# Patient Record
Sex: FEMALE | Race: WHITE | NOT HISPANIC OR LATINO | URBAN - METROPOLITAN AREA
[De-identification: names, ages, dates, MRNs, and addresses within clinical notes are randomized per-mention and may not be internally consistent; named-entity substitution may affect disease eponyms.]

---

## 2021-04-08 DIAGNOSIS — Z23 ENCOUNTER FOR IMMUNIZATION: ICD-10-CM

## 2023-03-08 ENCOUNTER — OFFICE VISIT (OUTPATIENT)
Dept: DERMATOLOGY | Facility: CLINIC | Age: 48
End: 2023-03-08

## 2023-03-08 VITALS — HEIGHT: 69 IN | WEIGHT: 200 LBS | TEMPERATURE: 97.9 F | BODY MASS INDEX: 29.62 KG/M2

## 2023-03-08 DIAGNOSIS — L73.2 HIDRADENITIS SUPPURATIVA: Primary | ICD-10-CM

## 2023-03-08 RX ORDER — BUSPIRONE HYDROCHLORIDE 15 MG/1
15 TABLET ORAL 2 TIMES DAILY
COMMUNITY
Start: 2020-10-01

## 2023-03-08 RX ORDER — LEVOTHYROXINE, LIOTHYRONINE 9.5; 2.25 UG/1; UG/1
TABLET ORAL
COMMUNITY
Start: 2023-03-03

## 2023-03-08 RX ORDER — CITALOPRAM 20 MG/1
20 TABLET ORAL DAILY
COMMUNITY
Start: 2023-03-06

## 2023-03-08 RX ORDER — METFORMIN HYDROCHLORIDE 500 MG/1
TABLET, EXTENDED RELEASE ORAL
COMMUNITY
Start: 2021-10-01

## 2023-03-08 RX ORDER — B-COMPLEX WITH VITAMIN C
TABLET ORAL
COMMUNITY

## 2023-03-08 RX ORDER — CHLORAL HYDRATE 500 MG
1 CAPSULE ORAL 2 TIMES DAILY
COMMUNITY

## 2023-03-08 RX ORDER — CETIRIZINE HYDROCHLORIDE 10 MG/1
10 TABLET ORAL DAILY
COMMUNITY

## 2023-03-08 RX ORDER — PYRIDOXINE HCL (VITAMIN B6) 100 MG
100 TABLET ORAL DAILY
COMMUNITY

## 2023-03-08 RX ORDER — LOSARTAN POTASSIUM 100 MG/1
100 TABLET ORAL DAILY
COMMUNITY
Start: 2022-12-26

## 2023-03-08 RX ORDER — LEVOTHYROXINE AND LIOTHYRONINE 76; 18 UG/1; UG/1
120 TABLET ORAL DAILY
COMMUNITY
Start: 2020-03-17

## 2023-03-08 NOTE — PROGRESS NOTES
[facility] Note     Patient Name: Vera Pathak  Encounter Date: 03/08/2023    Have you been cared for by a [place] [id number] Dermatologist in the last 3 years and, if so, which description applies to you? NO  I am considered a "new" patient and must complete all patient intake questions  I am FEMALE/of child-bearing potential     REVIEW OF SYSTEMS:  Have you recently had or currently have any of the following? · Recent fever or chills? YES, 1 week ago   · Any non-healing wound? YES,   · Are you pregnant or planning to become pregnant? No  · Are you currently or planning to be nursing or breast feeding? No   PAST MEDICAL HISTORY:  Have you personally ever had or currently have any of the following? If "YES," then please provide more detail  · Skin cancer (such as Melanoma, Basal Cell Carcinoma, Squamous Cell Carcinoma? No  · Tuberculosis, HIV/AIDS, Hepatitis B or C: No  · Systemic Immunosuppression such as Diabetes, Biologic or Immunotherapy, Chemotherapy, Organ Transplantation, Bone Marrow Transplantation YES, diabetes   · Radiation Treatment No   FAMILY HISTORY:  Any "first degree relatives" (parent, brother, sister, or child) with the following? • Skin Cancer, Pancreatic or Other Cancer? No   PATIENT EXPERIENCE:    • Do you want the Dermatologist to perform a COMPLETE skin exam today including a clinical examination under the "bra and underwear" areas? NO  • If necessary, do we have your permission to call and leave a detailed message on your Preferred Phone number that includes your specific medical information?   Yes      Allergies   Allergen Reactions   • Erythromycin Abdominal Pain, Other (See Comments) and Vomiting     vomit     • Vancomycin Abdominal Pain, GI Intolerance, Other (See Comments) and Vomiting     vomiting        Current Outpatient Medications:   •  B Complex Vitamins (Vitamin B Complex) TABS, , Disp: , Rfl:   •  busPIRone (BUSPAR) 15 mg tablet, Take 15 mg by mouth 2 (two) times a day, Disp: , Rfl:   •  cetirizine (ZyrTEC) 10 mg tablet, Take 10 mg by mouth daily, Disp: , Rfl:   •  citalopram (CeleXA) 20 mg tablet, Take 20 mg by mouth daily, Disp: , Rfl:   •  Diclofenac Sodium (VOLTAREN) 1 %, , Disp: , Rfl:   •  losartan (COZAAR) 100 MG tablet, Take 100 mg by mouth daily, Disp: , Rfl:   •  metFORMIN (GLUCOPHAGE-XR) 500 mg 24 hr tablet, take 2 tablets by mouth twice a day with meals, Disp: , Rfl:   •  NP Thyroid 15 MG tablet, take 1 tablet by mouth IN ADDITION  MGS FOR A TOTAL  MGS FOR 7 DAYS, Disp: , Rfl:   •  Omega-3 1000 MG CAPS, Take 1 g by mouth 2 (two) times a day, Disp: , Rfl:   •  pyridoxine (B-6) 100 MG tablet, Take 100 mg by mouth daily, Disp: , Rfl:   •  thyroid (ARMOUR) 120 MG tablet, Take 120 mg by mouth daily, Disp: , Rfl:           • Whom besides the patient is providing clinical information about today's encounter?   o NO ADDITIONAL HISTORIAN (patient alone provided history)    Physical Exam and Assessment/Plan by Diagnosis:    HIDRADENITIS SUPPURATIVA    Physical Exam:  • Anatomic Location Affected:  Axilla, groin   • Morphological Description:  Draining nodules, sinus tracts, tunneling, hurly stage 3   • Pertinent Positives:  • Pertinent Negatives: Additional History of Present Condition:  Patient states she has had hidradenitis for 15 years, she usually has flare ups in the groin, axilla, back, under breast, and posterior neck areas  Patient has used doxycycline and clindamycin in the past      Assessment and Plan:  Based on a thorough discussion of this condition and the management approach to it (including a comprehensive discussion of the known risks, side effects and potential benefits of treatment), the patient (family) agrees to implement the following specific plan:  • Do not smoke as it can trigger flare ups  • Loosing weight can also help     Discussed the several ways for treatment including:  the use of topical medications, wash with benzoyl peroxide or Hibiclens   oral medications such as antibiotics when having an active flare  steroid injections- will do PA  Incision and drainage  injectable medications for disease control like Humira which was discussed with patient that this medication lowers your immune system  Procedures, such as surgery  • Please contact endocrinologist to see if they are ok with you using Humira  • Recommend talking to PCP about switching losartan to spirolactone  What is hidradenitis suppurativa? Hidradenitis suppurativa is an inflammatory skin disease that affects apocrine gland-bearing skin in the axillae, in the groin, and under the breasts  It is characterised by recurrent boil-like nodules and abscesses that culminate in pus-like discharge, difficult-to-heal open wounds (sinuses) and scarring  Hidradenitis suppurativa also has significant psychological impact and many patients suffer from impairment of body image, depression and anxiety  The term hidradenitis implies it starts as an inflammatory disorder of sweat glands, which is now known to be incorrect  Hidradenitis suppurativa is also known as acne inversa  Who gets hidradenitis suppurativa? Hidradenitis often starts at puberty, and is most active between the ages of 21 and 36 years, and in women, can resolve at menopause  It is three times more common in females than in males  Risk factors include:  • Other family members with hidradenitis suppurativa  • Obesity and insulin resistance/metabolic syndrome  • Cigarette smoking  • Follicular occlusion disorders: acne conglobata, dissecting cellulitis, pilonidal sinus  • Inflammatory bowel disease (Crohn disease)  • Rare autoinflammatory syndromes associated with abnormalities of PSTPIP1 gene  *    * PAPA syndrome (pyogenic arthritis, pyoderma gangrenosum and acne), PASH syndrome (pyoderma gangrenosum, acne, suppurative hidradenitis) and PAPASH syndrome (pyogenic arthritis, pyoderma gangrenosum, acne, suppurative hidradenitis)  What causes hidradenitis suppurativa? Hidradenitis suppurativa is an autoinflammatory disorder  Although the exact cause is not yet understood, contributing factors include:  • Friction from clothing and body folds  • Aberrant immune response to commensal bacteria  • Abnormal cutaneous or follicular microbiome  • Follicular occlusion  • Release of pro-inflammatory cytokines  • Inflammation causing rupture of the follicular wall and destroying apocrine glands and ducts  • Secondary bacterial infection  • Certain drugs  What are the clinical features of hidradenitis suppurativa? Hidradenitis can affect a single or multiple areas in the armpits, neck, sub mammary area, and inner thighs  Anogenital involvement most commonly affects the groin, mons pubis, vulva (in females), sides of the scrotum (in males), perineum, buttocks and perianal folds  Signs include:  • Open and closed comedones  • Painful firm papules, larger nodules and pleated ridges  • Pustules, fluctuant pseudocysts and abscesses  • Pyogenic granulomas  • Draining sinuses linking inflammatory lesions  • Hypertrophic and atrophic scars  Many patients with hidradenitis suppurativa also suffer from other skin disorders, including acne, hirsutism and psoriasis  The severity and extent of hidradenitis suppurativa is recorded at assessment and when determining the impact of a treatment  The Kate system describes three distinct clinical stages:  1  Solitary or multiple, isolated abscess formation without scarring or sinus tracts  2  Recurrent abscesses, single or multiple widely  lesions, with sinus tract formation  3  Diffuse or broad involvement, with multiple interconnected sinus tracts and abscesses      Severe hidradenitis (Gipson Stage 3) has been associated with:  • Male sex  • Axillary and perianal involvement  • Obesity  • Smoking  • Higher risk of stroke, coronary artery disease, heart failure, and peripheral artery disease  • Disease duration  What is the treatment for hidradenitis suppurativa? General measures  • Weight loss; follow an anti-inflammatory, low-sugar, low-grain, low-dairy diet (mainly plants)  • Smoking cessation: this can lead to improvement within a few months  • Loose fitting clothing  • Daily unfragranced antiperspirants  • If prone to secondary infection, wash with antiseptics or take bleach baths  • Apply hydrogen peroxide solution or medical grade honey to reduce malodour  • Use peeling agents such as resorcinol 15% cream to de-roof nodules  • Apply simple dressings to draining sinuses  • Analgesics, such as paracetamol (acetaminophen), for pain control  • Seek help to manage anxiety and depression  Medical management of hidradenitis suppurativa  Medical management of hidradenitis suppurativa is difficult  Treatment is required long term  Effective options are listed below  Antibiotics  • Topical clindamycin, with benzoyl peroxide to reduce bacterial resistance  • Short course of oral antibiotics for acute staphylococcal abscesses, eg flucloxacillin  • Prolonged courses (minimum 3 months) of tetracycline, metronidazole, trimethoprim + sulphamethoxazole, fluoroquinolones, ertapenem or dapsone for their anti-inflammatory action  • 6-12 week courses of the combination of clindamycin (or doxycycline) and rifampicin for severe disease  Antiandrogens  • Long-term oral contraceptive pill; antiandrogenic progesterones drospirenone or cyproterone acetate may be more effective than standard combined pills  These are more suitable than progesterone-only pills or devices  • Spironolactone and finasteride  • Response takes 6 months or longer  Immunomodulatory treatments for severe disease  • Intralesional corticosteroids into nodules  • Systemic corticosteroids short-term for flares  • Methotrexate, ciclosporin, and azathioprine  • TNF-?  inhibitors adalimumab and infliximab, used in higher dose than required for psoriasis, are the most successful treatments to date  Note that paradoxically, they may sometimes induce new-onset hidradenitis suppurativa  • Other biologics are under investigation, such as the IL-1?  antagonist, canakinumab    Other medical treatments  • Metformin in patients with insulin resistance  • Acitretin (unsuitable for females of childbearing potential)  • Isotretinoin -- effective for acne but appears unhelpful for most cases of hidradenitis  • Colchicine  • Medical management of anxiety and depression    Surgical management of hidradenitis suppurativa  • Incision and drainage of acute abscesses  • Curettage and deroofing of nodules, abscesses and sinuses  • Laser ablation of nodules, abscesses and sinuses  • Wide local excision of persistent nodules  • Radical excisional surgery of entire affected area  • Nd:YAG laser hair removal    Scribe Attestation    I,:  Milo Schumacher MA am acting as a scribe while in the presence of the attending physician :       I,:  Catarina Ford MD personally performed the services described in this documentation    as scribed in my presence :

## 2023-03-08 NOTE — PATIENT INSTRUCTIONS
Assessment and Plan:  Based on a thorough discussion of this condition and the management approach to it (including a comprehensive discussion of the known risks, side effects and potential benefits of treatment), the patient (family) agrees to implement the following specific plan:  Do not smoke as it can trigger flare ups  Loosing weight can also help  Discussed the several ways for treatment including:  the use of topical medications, wash with benzoyl peroxide or Hibiclens   oral medications such as antibiotics when having an active flare  steroid injections  Incision and drainage  injectable medications for disease control like Humira which was discussed with patient that this medication lowers your immune system  Procedures, such as surgery  Please contact endocrinologist to see if they are ok with you using Humira  Recommend talking to PCP about switching losartan to spirolactone  What is hidradenitis suppurativa? Hidradenitis suppurativa is an inflammatory skin disease that affects apocrine gland-bearing skin in the axillae, in the groin, and under the breasts  It is characterised by recurrent boil-like nodules and abscesses that culminate in pus-like discharge, difficult-to-heal open wounds (sinuses) and scarring  Hidradenitis suppurativa also has significant psychological impact and many patients suffer from impairment of body image, depression and anxiety  The term hidradenitis implies it starts as an inflammatory disorder of sweat glands, which is now known to be incorrect  Hidradenitis suppurativa is also known as acne inversa  Who gets hidradenitis suppurativa? Hidradenitis often starts at puberty, and is most active between the ages of 21 and 36 years, and in women, can resolve at menopause  It is three times more common in females than in males  Risk factors include:   Other family members with hidradenitis suppurativa  Obesity and insulin resistance/metabolic syndrome  Cigarette smoking  Follicular occlusion disorders: acne conglobata, dissecting cellulitis, pilonidal sinus  Inflammatory bowel disease (Crohn disease)  Rare autoinflammatory syndromes associated with abnormalities of PSTPIP1 gene  *    * PAPA syndrome (pyogenic arthritis, pyoderma gangrenosum and acne), PASH syndrome (pyoderma gangrenosum, acne, suppurative hidradenitis) and PAPASH syndrome (pyogenic arthritis, pyoderma gangrenosum, acne, suppurative hidradenitis)  What causes hidradenitis suppurativa? Hidradenitis suppurativa is an autoinflammatory disorder  Although the exact cause is not yet understood, contributing factors include:  Friction from clothing and body folds  Aberrant immune response to commensal bacteria  Abnormal cutaneous or follicular microbiome  Follicular occlusion  Release of pro-inflammatory cytokines  Inflammation causing rupture of the follicular wall and destroying apocrine glands and ducts  Secondary bacterial infection  Certain drugs  What are the clinical features of hidradenitis suppurativa? Hidradenitis can affect a single or multiple areas in the armpits, neck, sub mammary area, and inner thighs  Anogenital involvement most commonly affects the groin, mons pubis, vulva (in females), sides of the scrotum (in males), perineum, buttocks and perianal folds  Signs include:  Open and closed comedones  Painful firm papules, larger nodules and pleated ridges  Pustules, fluctuant pseudocysts and abscesses  Pyogenic granulomas  Draining sinuses linking inflammatory lesions  Hypertrophic and atrophic scars  Many patients with hidradenitis suppurativa also suffer from other skin disorders, including acne, hirsutism and psoriasis  The severity and extent of hidradenitis suppurativa is recorded at assessment and when determining the impact of a treatment   The Kate system describes three distinct clinical stages:  Solitary or multiple, isolated abscess formation without scarring or sinus tracts  Recurrent abscesses, single or multiple widely  lesions, with sinus tract formation  Diffuse or broad involvement, with multiple interconnected sinus tracts and abscesses  Severe hidradenitis (Gipson Stage 3) has been associated with:  Male sex  Axillary and perianal involvement  Obesity  Smoking  Higher risk of stroke, coronary artery disease, heart failure, and peripheral artery disease  Disease duration  What is the treatment for hidradenitis suppurativa? General measures  Weight loss; follow an anti-inflammatory, low-sugar, low-grain, low-dairy diet (mainly plants)  Smoking cessation: this can lead to improvement within a few months  Loose fitting clothing  Daily unfragranced antiperspirants  If prone to secondary infection, wash with antiseptics or take bleach baths  Apply hydrogen peroxide solution or medical grade honey to reduce malodour  Use peeling agents such as resorcinol 15% cream to de-roof nodules  Apply simple dressings to draining sinuses  Analgesics, such as paracetamol (acetaminophen), for pain control  Seek help to manage anxiety and depression  Medical management of hidradenitis suppurativa  Medical management of hidradenitis suppurativa is difficult  Treatment is required long term  Effective options are listed below  Antibiotics  Topical clindamycin, with benzoyl peroxide to reduce bacterial resistance  Short course of oral antibiotics for acute staphylococcal abscesses, eg flucloxacillin  Prolonged courses (minimum 3 months) of tetracycline, metronidazole, trimethoprim + sulphamethoxazole, fluoroquinolones, ertapenem or dapsone for their anti-inflammatory action  6-12 week courses of the combination of clindamycin (or doxycycline) and rifampicin for severe disease  Antiandrogens  Long-term oral contraceptive pill; antiandrogenic progesterones drospirenone or cyproterone acetate may be more effective than standard combined pills   These are more suitable than progesterone-only pills or devices  Spironolactone and finasteride  Response takes 6 months or longer  Immunomodulatory treatments for severe disease  Intralesional corticosteroids into nodules  Systemic corticosteroids short-term for flares  Methotrexate, ciclosporin, and azathioprine  TNF-? inhibitors adalimumab and infliximab, used in higher dose than required for psoriasis, are the most successful treatments to date  Note that paradoxically, they may sometimes induce new-onset hidradenitis suppurativa  Other biologics are under investigation, such as the IL-1?  antagonist, canakinumab    Other medical treatments  Metformin in patients with insulin resistance  Acitretin (unsuitable for females of childbearing potential)  Isotretinoin -- effective for acne but appears unhelpful for most cases of hidradenitis  Colchicine  Medical management of anxiety and depression    Surgical management of hidradenitis suppurativa  Incision and drainage of acute abscesses  Curettage and deroofing of nodules, abscesses and sinuses  Laser ablation of nodules, abscesses and sinuses  Wide local excision of persistent nodules  Radical excisional surgery of entire affected area  Nd:YAG laser hair removal

## 2023-03-17 ENCOUNTER — APPOINTMENT (OUTPATIENT)
Dept: LAB | Facility: CLINIC | Age: 48
End: 2023-03-17

## 2023-03-17 ENCOUNTER — PATIENT MESSAGE (OUTPATIENT)
Dept: DERMATOLOGY | Facility: CLINIC | Age: 48
End: 2023-03-17

## 2023-03-17 DIAGNOSIS — L73.2 HIDRADENITIS SUPPURATIVA: ICD-10-CM

## 2023-03-18 LAB
HBV CORE AB SER QL: NORMAL
HBV SURFACE AB SER-ACNC: <3 MIU/ML
HBV SURFACE AG SER QL: NORMAL
HCV AB SER QL: NORMAL

## 2023-03-19 LAB
GAMMA INTERFERON BACKGROUND BLD IA-ACNC: 0.04 IU/ML
M TB IFN-G BLD-IMP: NEGATIVE
M TB IFN-G CD4+ BCKGRND COR BLD-ACNC: 0 IU/ML
M TB IFN-G CD4+ BCKGRND COR BLD-ACNC: 0 IU/ML
MITOGEN IGNF BCKGRD COR BLD-ACNC: >10 IU/ML

## 2023-03-20 ENCOUNTER — TELEPHONE (OUTPATIENT)
Dept: DERMATOLOGY | Facility: CLINIC | Age: 48
End: 2023-03-20

## 2023-03-21 NOTE — TELEPHONE ENCOUNTER
Please do PA for humira for hidradenitis  Hidradenitis suppurativa  Starter kit  Inject 160 mg subcutaneously on day 1  Inject 80 mg on day 15  Inject 80 mg every other week starting on day 29

## 2023-03-31 ENCOUNTER — TELEPHONE (OUTPATIENT)
Dept: DERMATOLOGY | Age: 48
End: 2023-03-31

## 2023-03-31 NOTE — TELEPHONE ENCOUNTER
Patient called to find out status of the kenalog injection and Humira prior authorization       Informed her the PA for humira was sent to the specialist on 3/20/23 and can take up to 30 days for an approval or denial  As for the kenalog injection informed her I will message the D[facility] in the room to check status and reach out to you to get scheduled   -patient understood

## 2023-05-17 ENCOUNTER — OFFICE VISIT (OUTPATIENT)
Dept: DERMATOLOGY | Facility: CLINIC | Age: 48
End: 2023-05-17

## 2023-05-17 DIAGNOSIS — L73.2 HIDRADENITIS SUPPURATIVA: Primary | ICD-10-CM

## 2023-05-17 NOTE — PROGRESS NOTES
HUMIRA Biologic Injectable Administration     Additional History of Present Condition:  Patient is present for education and administration of Humira  Medication administration order placed yes  Provider order matches prescription order yes  Assessment and Plan:  Based on a thorough discussion of this condition and the management approach to it (including a comprehensive discussion of the known risks, side effects and potential benefits of treatment), the patient (family) agrees to implement the following specific plan:  • First yes Humira injection administered today in the left upper am  • Verbal consent obtained to administer  • Next dose due 6/1/2023, then 6/15/2023  • Yes, Patient provided education and training to continue to administer this at home  • Side effects discussed and how to report  • Schedule 6 month follow up with ordering provider  Appointment scheduled       Biologic Injectable Administration Note  Diagnosis: Hidradenitis suppurativa   This is injection number 1    Informed consent: Discussed risks (Risks of hypersensitivity reaction, injection site reaction, conjunctivitis/keratitis, HSV reactivation, increased susceptibility to parasitic infections, inefficacy were reviewed ) Verbal consent obtained  Preparation: After discussion potential procedure related risks including pain, bleeding, new infection, reactivation of latent infection, inefficacy, increased risk of malignancy, hypersensitivity reaction, injection site reaction, verbal consent was obtained  The areas were cleansed with alcohol prep pads and allowed to fully air dry for 3 minutes  Procedure Details:  160 mg was injected subcutaneously in the left upper arm  Lot Number: [id number]  Expiration: 05/2024  Total Injected: 160 mg  NDC: 38957919510     Patient tolerated procedure well, with minimal pinpoint bleeding that was controlled with pressure  Aftercare was reviewed           IMPORTANT SAFETY INFORMATION   ABOUT HUMIRA® (adalimumab)1  What is the most important information I should know about HUMIRA? You should discuss the potential benefits and risks of HUMIRA with your doctor  HUMIRA is a TNF blocker medicine that can lower the ability of your immune system to fight infections  You should not start taking HUMIRA if you have any kind of infection unless your doctor says it is okay  · Serious infections have happened in people taking HUMIRA  These serious infections include tuberculosis (TB) and infections caused by viruses, fungi, or bacteria that have spread throughout the body  Some people have  from these infections  Your doctor should test you for TB before starting HUMIRA, and check you closely for signs and symptoms of TB during treatment with HUMIRA, even if your TB test was negative  If your doctor feels you are at risk, you may be treated with medicine for TB  · Cancer  For children and adults taking TNF blockers, including HUMIRA, the chance of getting lymphoma or other cancers may increase  There have been cases of unusual cancers in children, teenagers, and young adults using TNF blockers  Some people have developed a rare type of cancer called hepatosplenic T-cell lymphoma  This type of cancer often results in death  If using TNF blockers including HUMIRA, your chance of getting two types of skin cancer (basal cell and squamous cell) may increase  These types are generally not life-threatening if treated; tell your doctor if you have a bump or open sore that doesn’t heal   What should I tell my doctor BEFORE starting [address]?   Tell your doctor about all of your health conditions, including if you:  · Have an infection, are being treated for infection, or have symptoms of an infection  · Get a lot of infections or infections that keep coming back  · Have diabetes  · Have TB or have been in close contact with someone with TB, or were born in, lived in, or traveled where there is more risk for getting TB  · Live or have lived in an area (such as the [place] where there is an increased risk for getting certain kinds of fungal infections, such as histoplasmosis, coccidioidomycosis, or blastomycosis  These infections may happen or become more severe if you use HUMIRA  Ask your doctor if you are unsure if you have lived in these areas  · Have or have had hepatitis B  · Are scheduled for major surgery  · Have or have had cancer  · Have numbness or tingling or a nervous system disease such as multiple sclerosis or Guillain-Barré syndrome  · Have or had heart failure  · Have recently received or are scheduled to receive a vaccine  HUMIRA patients may receive vaccines, except for live vaccines  Children should be brought up to date on all vaccines before starting HUMIRA  · Are allergic to rubber, latex, or any HUMIRA ingredients  · Are pregnant, planning to become pregnant, breastfeeding, or planning to breastfeed  · Have a baby and you were using HUMIRA during your pregnancy  Tell your baby’s doctor before your baby receives any vaccines  Also tell your doctor about all the medicines you take  You should not take HUMIRA with ORENCIA® (abatacept), KINERET® (anakinra), REMICADE® (infliximab), ENBREL® (etanercept), CIMZIA® (certolizumab pegol), or SIMPONI® (golimumab)  Tell your doctor if you have ever used RITUXAN® (rituximab), IMURAN® (azathioprine), or PURINETHOL® (mercaptopurine, 6-MP)  What should I watch for AFTER starting [address] [facility]? HUMIRA can cause serious side effects, including:  · Serious infections  These include TB and infections caused by viruses, fungi, or bacteria  Symptoms related to TB include a cough, low-grade fever, weight loss, or loss of body fat and muscle  · Hepatitis B infection in carriers of the virus   Symptoms include muscle aches, feeling very tired, dark urine, skin or eyes that look yellow, little or no appetite, vomiting, reji-colored bowel movements, fever, chills, stomach discomfort, and skin rash  · Allergic reactions  Symptoms of a serious allergic reaction include hives, trouble breathing, and swelling of your face, eyes, lips, or mouth  · Nervous system problems  Signs and symptoms include numbness or tingling, problems with your vision, weakness in your arms or legs, and dizziness  · Blood problems (decreased blood cells that help fight infections or stop bleeding)  Symptoms include a fever that does not go away, bruising or bleeding very easily, or looking very pale  · Heart failure (new or worsening)  Symptoms include shortness of breath, swelling of your ankles or feet, and sudden weight gain  · Immune reactions including a lupus-like syndrome  Symptoms include chest discomfort or pain that does not go away, shortness of breath, joint pain, or rash on your cheeks or arms that gets worse in the sun  · Liver problems  Symptoms include feeling very tired, skin or eyes that look yellow, poor appetite or vomiting, and pain on the right side of your stomach (abdomen)  These problems can lead to liver failure and death  · Psoriasis (new or worsening)  Symptoms include red scaly patches or raised bumps that are filled with pus  Call your doctor or get medical care right away if you develop any of the above symptoms  Common side effects of HUMIRA include injection site reactions (pain, redness, rash, swelling, itching, or bruising), upper respiratory infections (sinus infections), headaches, rash, and nausea  These are not all of the possible side effects with HUMIRA  Tell your doctor if you have any side effect that bothers you or that does not go away    Remember, tell your doctor right away if you have an infection or symptoms of an infection, including:  · Fever, sweats, or chills  · Muscle aches  · Cough  · Shortness of breath  · Blood in phlegm  · Weight loss  · Warm, red, or painful skin or sores on your body  · Diarrhea or stomach pain  · Burning when you urinate  · Urinating more often than normal  · Feeling very tired  HUMIRA is given by injection under the skin  This is the most important information to know about HUMIRA  For more information, talk to your health care provider  Uses  HUMIRA is a prescription medicine used:  1  To reduce the signs and symptoms of:  ? Moderate to severe rheumatoid arthritis (RA) in adults  HUMIRA can be used alone, with methotrexate, or with certain other medicines  HUMIRA may prevent further damage to your bones and joints and may help your ability to perform daily activities  ? Moderate to severe polyarticular juvenile idiopathic arthritis (JOVANNY) in children 3years of age and older  HUMIRA can be used alone or with methotrexate  ? Psoriatic arthritis (PsA) in adults  HUMIRA can be used alone or with certain other medicines  HUMIRA may prevent further damage to your bones and joints and may help your ability to perform daily activities  ? Ankylosing spondylitis (AS) in adults  ? Moderate to severe hidradenitis suppurativa (HS) in people 12 years and older  2  To treat moderate to severe Crohn’s disease (CD) in adults and children 10years of age and older  3  To treat moderate to severe ulcerative colitis (UC) in adults and children 11years of age and older  It is not known if HUMIRA is effective in people who stopped responding to or could not tolerate anti-TNF medicines  4  To treat moderate to severe chronic plaque psoriasis (Ps) in adults who are ready for systemic therapy or phototherapy, and are under the care of a doctor who will decide if other systemic therapies are less appropriate  5  To treat non-infectious intermediate (middle part of the eye), posterior (back of the eye), and panuveitis (all parts of the eye) in adults and children 3years of age and older    [id number]      Scribe Attestation    I,:  Francis Earl am acting as a scribe while in the presence of the attending physician :       I,:  Cedric Prasad Riki Degroot MD personally performed the services described in this documentation    as scribed in my presence :

## 2023-05-17 NOTE — PATIENT INSTRUCTIONS
HUMIRA Biologic Injectable Administration     Additional History of Present Condition:  Patient is present for education and administration of Humira  Medication administration order placed yes  Provider order matches prescription order yes  Assessment and Plan:  Based on a thorough discussion of this condition and the management approach to it (including a comprehensive discussion of the known risks, side effects and potential benefits of treatment), the patient (family) agrees to implement the following specific plan:  First yes Humira injection administered today in the left upper am  Verbal consent obtained to administer  Next dose due 6/1/2023, then 6/15/2023  Yes, Patient provided education and training to continue to administer this at home  Side effects discussed and how to report  Schedule 6 month follow up with ordering provider  Appointment scheduled       Biologic Injectable Administration Note  Diagnosis: Hidradenitis suppurativa   This is injection number 1    Informed consent: Discussed risks (Risks of hypersensitivity reaction, injection site reaction, conjunctivitis/keratitis, HSV reactivation, increased susceptibility to parasitic infections, inefficacy were reviewed ) Verbal consent obtained  Preparation: After discussion potential procedure related risks including pain, bleeding, new infection, reactivation of latent infection, inefficacy, increased risk of malignancy, hypersensitivity reaction, injection site reaction, verbal consent was obtained  The areas were cleansed with alcohol prep pads and allowed to fully air dry for 3 minutes  Procedure Details:  160 mg was injected subcutaneously in the left upper arm  Lot Number: [id number]  Expiration: 05/2024  Total Injected: 160 mg  NDC: 30492287487     Patient tolerated procedure well, with minimal pinpoint bleeding that was controlled with pressure  Aftercare was reviewed           IMPORTANT SAFETY INFORMATION   ABOUT HUMIRA® (adalimumab)1  What is the most important information I should know about HUMIRA? You should discuss the potential benefits and risks of HUMIRA with your doctor  HUMIRA is a TNF blocker medicine that can lower the ability of your immune system to fight infections  You should not start taking HUMIRA if you have any kind of infection unless your doctor says it is okay  Serious infections have happened in people taking HUMIRA  These serious infections include tuberculosis (TB) and infections caused by viruses, fungi, or bacteria that have spread throughout the body  Some people have  from these infections  Your doctor should test you for TB before starting HUMIRA, and check you closely for signs and symptoms of TB during treatment with HUMIRA, even if your TB test was negative  If your doctor feels you are at risk, you may be treated with medicine for TB  Cancer  For children and adults taking TNF blockers, including HUMIRA, the chance of getting lymphoma or other cancers may increase  There have been cases of unusual cancers in children, teenagers, and young adults using TNF blockers  Some people have developed a rare type of cancer called hepatosplenic T-cell lymphoma  This type of cancer often results in death  If using TNF blockers including HUMIRA, your chance of getting two types of skin cancer (basal cell and squamous cell) may increase  These types are generally not life-threatening if treated; tell your doctor if you have a bump or open sore that doesn’t heal   What should I tell my doctor BEFORE starting [address]?   Tell your doctor about all of your health conditions, including if you:  Have an infection, are being treated for infection, or have symptoms of an infection  Get a lot of infections or infections that keep coming back  Have diabetes  Have TB or have been in close contact with someone with TB, or were born in, lived in, or traveled where there is more risk for getting TB  Live or have lived in an area (such as the [place] where there is an increased risk for getting certain kinds of fungal infections, such as histoplasmosis, coccidioidomycosis, or blastomycosis  These infections may happen or become more severe if you use HUMIRA  Ask your doctor if you are unsure if you have lived in these areas  Have or have had hepatitis B  Are scheduled for major surgery  Have or have had cancer  Have numbness or tingling or a nervous system disease such as multiple sclerosis or Guillain-Barré syndrome  Have or had heart failure  Have recently received or are scheduled to receive a vaccine  HUMIRA patients may receive vaccines, except for live vaccines  Children should be brought up to date on all vaccines before starting HUMIRA  Are allergic to rubber, latex, or any HUMIRA ingredients  Are pregnant, planning to become pregnant, breastfeeding, or planning to breastfeed  Have a baby and you were using HUMIRA during your pregnancy  Tell your baby’s doctor before your baby receives any vaccines  Also tell your doctor about all the medicines you take  You should not take HUMIRA with ORENCIA® (abatacept), KINERET® (anakinra), REMICADE® (infliximab), ENBREL® (etanercept), CIMZIA® (certolizumab pegol), or SIMPONI® (golimumab)  Tell your doctor if you have ever used RITUXAN® (rituximab), IMURAN® (azathioprine), or PURINETHOL® (mercaptopurine, 6-MP)  What should I watch for AFTER starting [address]? HUMIRA can cause serious side effects, including:  Serious infections  These include TB and infections caused by viruses, fungi, or bacteria  Symptoms related to TB include a cough, low-grade fever, weight loss, or loss of body fat and muscle  Hepatitis B infection in carriers of the virus  Symptoms include muscle aches, feeling very tired, dark urine, skin or eyes that look yellow, little or no appetite, vomiting, reji-colored bowel movements, fever, chills, stomach discomfort, and skin rash    Allergic reactions  Symptoms of a serious allergic reaction include hives, trouble breathing, and swelling of your face, eyes, lips, or mouth  Nervous system problems  Signs and symptoms include numbness or tingling, problems with your vision, weakness in your arms or legs, and dizziness  Blood problems (decreased blood cells that help fight infections or stop bleeding)  Symptoms include a fever that does not go away, bruising or bleeding very easily, or looking very pale  Heart failure (new or worsening)  Symptoms include shortness of breath, swelling of your ankles or feet, and sudden weight gain  Immune reactions including a lupus-like syndrome  Symptoms include chest discomfort or pain that does not go away, shortness of breath, joint pain, or rash on your cheeks or arms that gets worse in the sun  Liver problems  Symptoms include feeling very tired, skin or eyes that look yellow, poor appetite or vomiting, and pain on the right side of your stomach (abdomen)  These problems can lead to liver failure and death  Psoriasis (new or worsening)  Symptoms include red scaly patches or raised bumps that are filled with pus  Call your doctor or get medical care right away if you develop any of the above symptoms  Common side effects of HUMIRA include injection site reactions (pain, redness, rash, swelling, itching, or bruising), upper respiratory infections (sinus infections), headaches, rash, and nausea  These are not all of the possible side effects with HUMIRA  Tell your doctor if you have any side effect that bothers you or that does not go away    Remember, tell your doctor right away if you have an infection or symptoms of an infection, including:  Fever, sweats, or chills  Muscle aches  Cough  Shortness of breath  Blood in phlegm  Weight loss  Warm, red, or painful skin or sores on your body  Diarrhea or stomach pain  Burning when you urinate  Urinating more often than normal  Feeling very tired  HUMIRA is given by injection under the skin  This is the most important information to know about HUMIRA  For more information, talk to your health care provider  Uses  HUMIRA is a prescription medicine used: To reduce the signs and symptoms of: Moderate to severe rheumatoid arthritis (RA) in adults  HUMIRA can be used alone, with methotrexate, or with certain other medicines  HUMIRA may prevent further damage to your bones and joints and may help your ability to perform daily activities  Moderate to severe polyarticular juvenile idiopathic arthritis (JOVANNY) in children 3years of age and older  HUMIRA can be used alone or with methotrexate  Psoriatic arthritis (PsA) in adults  HUMIRA can be used alone or with certain other medicines  HUMIRA may prevent further damage to your bones and joints and may help your ability to perform daily activities  Ankylosing spondylitis (AS) in adults  Moderate to severe hidradenitis suppurativa (HS) in people 12 years and older  To treat moderate to severe Crohn’s disease (CD) in adults and children 10years of age and older  To treat moderate to severe ulcerative colitis (UC) in adults and children 11years of age and older  It is not known if HUMIRA is effective in people who stopped responding to or could not tolerate anti-TNF medicines  To treat moderate to severe chronic plaque psoriasis (Ps) in adults who are ready for systemic therapy or phototherapy, and are under the care of a doctor who will decide if other systemic therapies are less appropriate  To treat non-infectious intermediate (middle part of the eye), posterior (back of the eye), and panuveitis (all parts of the eye) in adults and children 3years of age and older    [id number]

## 2023-06-14 ENCOUNTER — TELEPHONE (OUTPATIENT)
Dept: DERMATOLOGY | Facility: CLINIC | Age: 48
End: 2023-06-14

## 2023-06-14 NOTE — TELEPHONE ENCOUNTER
Pt calling to state that she is having an HS flare-up in R armpit and hardly able to lift arm, and was asking if you would be willing to overbook for an injection? There are no available apts and pt not scheduled until October  Please advise if you would be willing to offer pt apt for injection w/in next few days  Thank you!

## 2023-06-15 ENCOUNTER — OFFICE VISIT (OUTPATIENT)
Dept: DERMATOLOGY | Facility: CLINIC | Age: 48
End: 2023-06-15
Payer: COMMERCIAL

## 2023-06-15 VITALS — HEIGHT: 69 IN | WEIGHT: 200 LBS | BODY MASS INDEX: 29.62 KG/M2 | TEMPERATURE: 98.2 F

## 2023-06-15 DIAGNOSIS — L73.2 HIDRADENITIS SUPPURATIVA: Primary | ICD-10-CM

## 2023-06-15 PROCEDURE — 99214 OFFICE O/P EST MOD 30 MIN: CPT | Performed by: DERMATOLOGY

## 2023-06-15 PROCEDURE — 11900 INJECT SKIN LESIONS </W 7: CPT | Performed by: DERMATOLOGY

## 2023-06-15 RX ORDER — CHOLESTYRAMINE 4 G/5.5G
POWDER, FOR SUSPENSION ORAL
COMMUNITY
Start: 2023-05-30

## 2023-06-15 RX ORDER — TRIAMCINOLONE ACETONIDE 40 MG/ML
40 INJECTION, SUSPENSION INTRA-ARTICULAR; INTRAMUSCULAR ONCE
Status: COMPLETED | OUTPATIENT
Start: 2023-06-15 | End: 2023-06-15

## 2023-06-15 RX ORDER — FAMOTIDINE 40 MG/1
40 TABLET, FILM COATED ORAL DAILY
COMMUNITY
Start: 2023-05-18

## 2023-06-15 RX ADMIN — TRIAMCINOLONE ACETONIDE 40 MG: 40 INJECTION, SUSPENSION INTRA-ARTICULAR; INTRAMUSCULAR at 15:52

## 2023-06-15 NOTE — PATIENT INSTRUCTIONS
Assessment and Plan:  Based on a thorough discussion of this condition and the management approach to it (including a comprehensive discussion of the known risks, side effects and potential benefits of treatment), the patient (family) agrees to implement the following specific plan:  Kenalog injection done in office today   Continue doxycyline   Continue Hunira injections  De-ryann procedure discussed     What is hidradenitis suppurativa? Hidradenitis suppurativa is an inflammatory skin disease that affects apocrine gland-bearing skin in the axillae, in the groin, and under the breasts  It is characterised by recurrent boil-like nodules and abscesses that culminate in pus-like discharge, difficult-to-heal open wounds (sinuses) and scarring  Hidradenitis suppurativa also has significant psychological impact and many patients suffer from impairment of body image, depression and anxiety  The term hidradenitis implies it starts as an inflammatory disorder of sweat glands, which is now known to be incorrect  Hidradenitis suppurativa is also known as acne inversa  Who gets hidradenitis suppurativa? Hidradenitis often starts at puberty, and is most active between the ages of 21 and 36 years, and in women, can resolve at menopause  It is three times more common in females than in males  Risk factors include: Other family members with hidradenitis suppurativa  Obesity and insulin resistance/metabolic syndrome  Cigarette smoking  Follicular occlusion disorders: acne conglobata, dissecting cellulitis, pilonidal sinus  Inflammatory bowel disease (Crohn disease)  Rare autoinflammatory syndromes associated with abnormalities of PSTPIP1 gene  *    * PAPA syndrome (pyogenic arthritis, pyoderma gangrenosum and acne), PASH syndrome (pyoderma gangrenosum, acne, suppurative hidradenitis) and PAPASH syndrome (pyogenic arthritis, pyoderma gangrenosum, acne, suppurative hidradenitis)      What causes hidradenitis suppurativa? Hidradenitis suppurativa is an autoinflammatory disorder  Although the exact cause is not yet understood, contributing factors include:  Friction from clothing and body folds  Aberrant immune response to commensal bacteria  Abnormal cutaneous or follicular microbiome  Follicular occlusion  Release of pro-inflammatory cytokines  Inflammation causing rupture of the follicular wall and destroying apocrine glands and ducts  Secondary bacterial infection  Certain drugs  What are the clinical features of hidradenitis suppurativa? Hidradenitis can affect a single or multiple areas in the armpits, neck, sub mammary area, and inner thighs  Anogenital involvement most commonly affects the groin, mons pubis, vulva (in females), sides of the scrotum (in males), perineum, buttocks and perianal folds  Signs include:  Open and closed comedones  Painful firm papules, larger nodules and pleated ridges  Pustules, fluctuant pseudocysts and abscesses  Pyogenic granulomas  Draining sinuses linking inflammatory lesions  Hypertrophic and atrophic scars  Many patients with hidradenitis suppurativa also suffer from other skin disorders, including acne, hirsutism and psoriasis  The severity and extent of hidradenitis suppurativa is recorded at assessment and when determining the impact of a treatment  The Kate system describes three distinct clinical stages:  Solitary or multiple, isolated abscess formation without scarring or sinus tracts  Recurrent abscesses, single or multiple widely  lesions, with sinus tract formation  Diffuse or broad involvement, with multiple interconnected sinus tracts and abscesses  Severe hidradenitis (Gipson Stage 3) has been associated with:  Male sex  Axillary and perianal involvement  Obesity  Smoking  Higher risk of stroke, coronary artery disease, heart failure, and peripheral artery disease  Disease duration      What is the treatment for hidradenitis suppurativa? General measures  Weight loss; follow an anti-inflammatory, low-sugar, low-grain, low-dairy diet (mainly plants)  Smoking cessation: this can lead to improvement within a few months  Loose fitting clothing  Daily unfragranced antiperspirants  If prone to secondary infection, wash with antiseptics or take bleach baths  Apply hydrogen peroxide solution or medical grade honey to reduce malodour  Use peeling agents such as resorcinol 15% cream to de-roof nodules  Apply simple dressings to draining sinuses  Analgesics, such as paracetamol (acetaminophen), for pain control  Seek help to manage anxiety and depression  Medical management of hidradenitis suppurativa  Medical management of hidradenitis suppurativa is difficult  Treatment is required long term  Effective options are listed below  Antibiotics  Topical clindamycin, with benzoyl peroxide to reduce bacterial resistance  Short course of oral antibiotics for acute staphylococcal abscesses, eg flucloxacillin  Prolonged courses (minimum 3 months) of tetracycline, metronidazole, trimethoprim + sulphamethoxazole, fluoroquinolones, ertapenem or dapsone for their anti-inflammatory action  6-12 week courses of the combination of clindamycin (or doxycycline) and rifampicin for severe disease  Antiandrogens  Long-term oral contraceptive pill; antiandrogenic progesterones drospirenone or cyproterone acetate may be more effective than standard combined pills  These are more suitable than progesterone-only pills or devices  Spironolactone and finasteride  Response takes 6 months or longer  Immunomodulatory treatments for severe disease  Intralesional corticosteroids into nodules  Systemic corticosteroids short-term for flares  Methotrexate, ciclosporin, and azathioprine  TNF-? inhibitors adalimumab and infliximab, used in higher dose than required for psoriasis, are the most successful treatments to date   Note that paradoxically, they may sometimes induce new-onset hidradenitis suppurativa  Other biologics are under investigation, such as the IL-1? antagonist, canakinumab    Other medical treatments  Metformin in patients with insulin resistance  Acitretin (unsuitable for females of childbearing potential)  Isotretinoin -- effective for acne but appears unhelpful for most cases of hidradenitis  Colchicine  Medical management of anxiety and depression    Surgical management of hidradenitis suppurativa  Incision and drainage of acute abscesses  Curettage and deroofing of nodules, abscesses and sinuses  Laser ablation of nodules, abscesses and sinuses  Wide local excision of persistent nodules  Radical excisional surgery of entire affected area  Nd:YAG laser hair removal  Purpose: Triamcinolone is a synthetic glucocorticoid corticosteroid that has marked anti-inflammatory action  It is prepared in sterile aqueous suspension suitable for injecting directly into a lesion on or immediately below the skin to treat a dermal inflammatory process  Indications: It is indicated for alopecia areata; inflammatory acne cysts; discoid lupus erythematosus; keloids and hypertrophic scars; inflammatory lesions of granuloma annulare, lichen planus, lichen simplex chronicus (neurodermatitis), psoriatic plaques, and other localized inflammatory skin conditions       Potential Side Effects: I understand that triamcinolone injection can potentially cause early and/or delayed adverse effects such as:    Pain    Impaired wound healing    Increased hair growth    Bleeding    White or brown marks    Steroid acne    Infection    Telangiectasia    Skin thinning    Cutaneous and subcutaneous lipoatrophy (most common) appearing as skin indentations or dimples around the injection sites a few weeks after treatment

## 2023-06-15 NOTE — PROGRESS NOTES
"[facility] Dermatology Clinic Note     Patient Name: Terry Li  Encounter Date: 06/15/2023    Have you been cared for by a [facility] Dermatologist in the last 3 years and, if so, which description applies to you? Yes  I have been here within the last 3 years, and my medical history has NOT changed since that time  I am FEMALE/of child-bearing potential     REVIEW OF SYSTEMS:  Have you recently had or currently have any of the following? · No changes in my recent health  PAST MEDICAL HISTORY:  Have you personally ever had or currently have any of the following? If \"YES,\" then please provide more detail  · No changes in my medical history  FAMILY HISTORY:  Any \"first degree relatives\" (parent, brother, sister, or child) with the following? • No changes in my family's known health  PATIENT EXPERIENCE:    • Do you want the Dermatologist to perform a COMPLETE skin exam today including a clinical examination under the \"bra and underwear\" areas? NO  • If necessary, do we have your permission to call and leave a detailed message on your Preferred Phone number that includes your specific medical information?   Yes      Allergies   Allergen Reactions   • Erythromycin Abdominal Pain, Other (See Comments) and Vomiting     vomit     • Vancomycin Abdominal Pain, GI Intolerance, Other (See Comments) and Vomiting     vomiting        Current Outpatient Medications:   •  Adalimumab (Humira Pen) 80 MG/0 8ML PNKT, Inject 80 mg under the skin every 14 (fourteen) days Starting day 43, Disp: 2 each, Rfl: 11  •  Adalimumab (Humira Pen-CD/UC/HS Starter) 80 MG/0 8ML PNKT, Loading dose: 160 mg subcutaneous on day 1, 80 mg subcutaneous on days 15 and 29, Disp: 4 each, Rfl: 0  •  B Complex Vitamins (Vitamin B Complex) TABS, , Disp: , Rfl:   •  busPIRone (BUSPAR) 15 mg tablet, Take 15 mg by mouth 2 (two) times a day, Disp: , Rfl:   •  cetirizine (ZyrTEC) 10 mg tablet, Take 10 mg by mouth daily, Disp: , Rfl:   •  citalopram " (CeleXA) 20 mg tablet, Take 20 mg by mouth daily, Disp: , Rfl:   •  Diclofenac Sodium (VOLTAREN) 1 %, , Disp: , Rfl:   •  doxycycline hyclate (VIBRAMYCIN) 100 mg capsule, Take 1 capsule (100 mg total) by mouth every 12 (twelve) hours for 14 days, Disp: 28 capsule, Rfl: 0  •  famotidine (PEPCID) 40 MG tablet, Take 40 mg by mouth daily, Disp: , Rfl:   •  losartan (COZAAR) 100 MG tablet, Take 100 mg by mouth daily, Disp: , Rfl:   •  metFORMIN (GLUCOPHAGE-XR) 500 mg 24 hr tablet, take 2 tablets by mouth twice a day with meals, Disp: , Rfl:   •  NP Thyroid 15 MG tablet, take 1 tablet by mouth IN ADDITION  MGS FOR A TOTAL  MGS FOR 7 DAYS, Disp: , Rfl:   •  Omega-3 1000 MG CAPS, Take 1 g by mouth 2 (two) times a day, Disp: , Rfl:   •  Prevalite 4 g packet, take 1 packet by mouth twice a day, Disp: , Rfl:   •  pyridoxine (B-6) 100 MG tablet, Take 100 mg by mouth daily, Disp: , Rfl:   •  thyroid (ARMOUR) 120 MG tablet, Take 120 mg by mouth daily, Disp: , Rfl:           • Whom besides the patient is providing clinical information about today's encounter?   o NO ADDITIONAL HISTORIAN (patient alone provided history)    Physical Exam and Assessment/Plan by Diagnosis:    HIDRADENITIS SUPPURATIVA    Physical Exam:  • Anatomic Location Affected:  Left axilla   • Morphological Description:  Tender draining sinus tract, scarring, scott stage 3  • Pertinent Positives:  • Pertinent Negatives: Additional History of Present Condition:  Patient has a new lesion under arm, states it did burst last night and its still draining  Patient started doxycycline yesterday  Patient also on humira just took her second shot       Assessment and Plan:  Based on a thorough discussion of this condition and the management approach to it (including a comprehensive discussion of the known risks, side effects and potential benefits of treatment), the patient (family) agrees to implement the following specific plan:  • Kenalog injection done in office today   • Continue doxycyline   • Continue Hunira injections  • De-ryann procedure discussed     What is hidradenitis suppurativa? Hidradenitis suppurativa is an inflammatory skin disease that affects apocrine gland-bearing skin in the axillae, in the groin, and under the breasts  It is characterised by recurrent boil-like nodules and abscesses that culminate in pus-like discharge, difficult-to-heal open wounds (sinuses) and scarring  Hidradenitis suppurativa also has significant psychological impact and many patients suffer from impairment of body image, depression and anxiety  The term hidradenitis implies it starts as an inflammatory disorder of sweat glands, which is now known to be incorrect  Hidradenitis suppurativa is also known as acne inversa  Who gets hidradenitis suppurativa? Hidradenitis often starts at puberty, and is most active between the ages of 21 and 36 years, and in women, can resolve at menopause  It is three times more common in females than in males  Risk factors include:  • Other family members with hidradenitis suppurativa  • Obesity and insulin resistance/metabolic syndrome  • Cigarette smoking  • Follicular occlusion disorders: acne conglobata, dissecting cellulitis, pilonidal sinus  • Inflammatory bowel disease (Crohn disease)  • Rare autoinflammatory syndromes associated with abnormalities of PSTPIP1 gene  *    * PAPA syndrome (pyogenic arthritis, pyoderma gangrenosum and acne), PASH syndrome (pyoderma gangrenosum, acne, suppurative hidradenitis) and PAPASH syndrome (pyogenic arthritis, pyoderma gangrenosum, acne, suppurative hidradenitis)  What causes hidradenitis suppurativa? Hidradenitis suppurativa is an autoinflammatory disorder   Although the exact cause is not yet understood, contributing factors include:  • Friction from clothing and body folds  • Aberrant immune response to commensal bacteria  • Abnormal cutaneous or follicular microbiome  • Follicular occlusion  • Release of pro-inflammatory cytokines  • Inflammation causing rupture of the follicular wall and destroying apocrine glands and ducts  • Secondary bacterial infection  • Certain drugs  What are the clinical features of hidradenitis suppurativa? Hidradenitis can affect a single or multiple areas in the armpits, neck, sub mammary area, and inner thighs  Anogenital involvement most commonly affects the groin, mons pubis, vulva (in females), sides of the scrotum (in males), perineum, buttocks and perianal folds  Signs include:  • Open and closed comedones  • Painful firm papules, larger nodules and pleated ridges  • Pustules, fluctuant pseudocysts and abscesses  • Pyogenic granulomas  • Draining sinuses linking inflammatory lesions  • Hypertrophic and atrophic scars  Many patients with hidradenitis suppurativa also suffer from other skin disorders, including acne, hirsutism and psoriasis  The severity and extent of hidradenitis suppurativa is recorded at assessment and when determining the impact of a treatment  The Kate system describes three distinct clinical stages:  1  Solitary or multiple, isolated abscess formation without scarring or sinus tracts  2  Recurrent abscesses, single or multiple widely  lesions, with sinus tract formation  3  Diffuse or broad involvement, with multiple interconnected sinus tracts and abscesses  Severe hidradenitis (Gipson Stage 3) has been associated with:  • Male sex  • Axillary and perianal involvement  • Obesity  • Smoking  • Higher risk of stroke, coronary artery disease, heart failure, and peripheral artery disease  • Disease duration  What is the treatment for hidradenitis suppurativa?   General measures  • Weight loss; follow an anti-inflammatory, low-sugar, low-grain, low-dairy diet (mainly plants)  • Smoking cessation: this can lead to improvement within a few months  • Loose fitting clothing  • Daily unfragranced antiperspirants  • If prone to secondary infection, wash with antiseptics or take bleach baths  • Apply hydrogen peroxide solution or medical grade honey to reduce malodour  • Use peeling agents such as resorcinol 15% cream to de-roof nodules  • Apply simple dressings to draining sinuses  • Analgesics, such as paracetamol (acetaminophen), for pain control  • Seek help to manage anxiety and depression  Medical management of hidradenitis suppurativa  Medical management of hidradenitis suppurativa is difficult  Treatment is required long term  Effective options are listed below  Antibiotics  • Topical clindamycin, with benzoyl peroxide to reduce bacterial resistance  • Short course of oral antibiotics for acute staphylococcal abscesses, eg flucloxacillin  • Prolonged courses (minimum 3 months) of tetracycline, metronidazole, trimethoprim + sulphamethoxazole, fluoroquinolones, ertapenem or dapsone for their anti-inflammatory action  • 6-12 week courses of the combination of clindamycin (or doxycycline) and rifampicin for severe disease  Antiandrogens  • Long-term oral contraceptive pill; antiandrogenic progesterones drospirenone or cyproterone acetate may be more effective than standard combined pills  These are more suitable than progesterone-only pills or devices  • Spironolactone and finasteride  • Response takes 6 months or longer  Immunomodulatory treatments for severe disease  • Intralesional corticosteroids into nodules  • Systemic corticosteroids short-term for flares  • Methotrexate, ciclosporin, and azathioprine  • TNF-? inhibitors adalimumab and infliximab, used in higher dose than required for psoriasis, are the most successful treatments to date  Note that paradoxically, they may sometimes induce new-onset hidradenitis suppurativa  • Other biologics are under investigation, such as the IL-1?  antagonist, canakinumab    Other medical treatments  • Metformin in patients with insulin resistance  • Acitretin (unsuitable for females of childbearing potential)  • Isotretinoin -- effective for acne but appears unhelpful for most cases of hidradenitis  • Colchicine  • Medical management of anxiety and depression    Surgical management of hidradenitis suppurativa  • Incision and drainage of acute abscesses  • Curettage and deroofing of nodules, abscesses and sinuses  • Laser ablation of nodules, abscesses and sinuses  • Wide local excision of persistent nodules  • Radical excisional surgery of entire affected area  • Nd:YAG laser hair removal  PROCEDURE:  INTRALESIONAL STEROID INJECTION (KENALOG INJECTION)    Purpose: Triamcinolone is a synthetic glucocorticoid corticosteroid that has marked anti-inflammatory action  It is prepared in sterile aqueous suspension suitable for injecting directly into a lesion on or immediately below the skin to treat a dermal inflammatory process  Indications: It is indicated for alopecia areata; inflammatory acne cysts; discoid lupus erythematosus; keloids and hypertrophic scars; inflammatory lesions of granuloma annulare, lichen planus, lichen simplex chronicus (neurodermatitis), psoriatic plaques, and other localized inflammatory skin conditions  Potential Side Effects: I understand that triamcinolone injection can potentially cause early and/or delayed adverse effects such as:   • Pain   • Impaired wound healing   • Increased hair growth   • Bleeding   • White or brown marks   • Steroid acne   • Infection   • Telangiectasia   • Skin thinning   • Cutaneous and subcutaneous lipoatrophy (most common) appearing as skin indentations or dimples around the injection sites a few weeks after treatment     PROCEDURE NOTE:  After verbal and written consent were obtained, the to-be-treated area was wiped and cleaned with rubbing alcohol 70%  Then, a total of 1 mL of Kenalog CONCENTRATION: 40 mg/mL   (Lot# [id number];  Expiration 10/31/2024, NDC#: [id number]) was injected intralesionally into a total of 1 lesion/s on the following anatomic areas:  Left axiila using a 3-mL syringe and a 30-gauge needle  There was less than 1 mL of blood loss and little to no discomfort  The area was bandaged with a Band-aid  The patient tolerated the procedure well and remained in the office for observation  With no signs of an adverse reaction, the patient was eventually discharged from clinic        Scribe Attestation    I,:  Kristi Rocha MA am acting as a scribe while in the presence of the attending physician :       I,:  Mery Stein MD personally performed the services described in this documentation    as scribed in my presence :

## 2023-06-26 ENCOUNTER — OFFICE VISIT (OUTPATIENT)
Dept: URGENT CARE | Facility: CLINIC | Age: 48
End: 2023-06-26
Payer: COMMERCIAL

## 2023-06-26 VITALS
OXYGEN SATURATION: 96 % | DIASTOLIC BLOOD PRESSURE: 76 MMHG | SYSTOLIC BLOOD PRESSURE: 118 MMHG | RESPIRATION RATE: 16 BRPM | TEMPERATURE: 97.8 F | BODY MASS INDEX: 29.57 KG/M2 | HEART RATE: 72 BPM | WEIGHT: 200.25 LBS

## 2023-06-26 DIAGNOSIS — M54.50 ACUTE RIGHT-SIDED LOW BACK PAIN WITHOUT SCIATICA: Primary | ICD-10-CM

## 2023-06-26 PROCEDURE — 99213 OFFICE O/P EST LOW 20 MIN: CPT

## 2023-06-26 PROCEDURE — 96372 THER/PROPH/DIAG INJ SC/IM: CPT

## 2023-06-26 RX ORDER — PREDNISONE 20 MG/1
40 TABLET ORAL DAILY
Qty: 10 TABLET | Refills: 0 | Status: SHIPPED | OUTPATIENT
Start: 2023-06-26 | End: 2023-07-01

## 2023-06-26 RX ORDER — KETOROLAC TROMETHAMINE 30 MG/ML
30 INJECTION, SOLUTION INTRAMUSCULAR; INTRAVENOUS ONCE
Status: COMPLETED | OUTPATIENT
Start: 2023-06-26 | End: 2023-06-26

## 2023-06-26 RX ORDER — METHOCARBAMOL 500 MG/1
500 TABLET, FILM COATED ORAL 4 TIMES DAILY
Qty: 30 TABLET | Refills: 0 | Status: SHIPPED | OUTPATIENT
Start: 2023-06-26

## 2023-06-26 RX ADMIN — KETOROLAC TROMETHAMINE 30 MG: 30 INJECTION, SOLUTION INTRAMUSCULAR; INTRAVENOUS at 12:10

## 2023-06-26 NOTE — PROGRESS NOTES
[address] [employer] [facility]        NAME: Aileen Park is a 52 y o  female  : 1975    MRN: [id number]  DATE: 2023  TIME: 12:13 PM    Assessment and Plan   Acute right-sided low back pain without sciatica [M54 50]  1  Acute right-sided low back pain without sciatica  ketorolac (TORADOL) injection 30 mg    predniSONE 20 mg tablet    methocarbamol (ROBAXIN) 500 mg tablet    Ambulatory referral to Physical Therapy        IM toradol given in clinic today  Patient Instructions     Do not take NSAIDs while taking prednisone  May take tylenol as needed  Do not drive or operate heavy machinery while taking muscle relaxer  Over the counter topical pain medication rubs  Rest (for no longer than 24 hours)  Stretching exercises, see attached instructions  Alternate ice and heat  Consider physical therapy if no improvement after 1 week  Consider chiropractic treatments  Follow up with PCP in 3-5 days  Proceed to the ER with worsening symptoms, or if you experience loss of bowel/bladder function, numbness in groin (saddle anaesthesia)  Chief Complaint     Chief Complaint   Patient presents with   • Back Pain     Started , lower back tailbone, constant  Worse with certain movements, stabbing sensation  Using ice, heat ibuprofen  No falls, no trauma, no injury  Has h/o back injury x20 years ago  History of Present Illness       The patient presents today with complaints of L lower back pain that started on Sun  She denies any fall or injury  She reports that she was at a concert on Sat and was dancing, and then woke up with the pain on Sun morning  She denies bowel/bladder incontinence or saddle anesthesia  She has a history of back pain in the early , but denies back surgery  Review of Systems   Review of Systems   Constitutional: Negative for chills, fatigue and fever  HENT: Negative for congestion  Eyes: Negative      Respiratory: Negative for cough and shortness of breath  Cardiovascular: Negative for chest pain and palpitations  Gastrointestinal: Negative for abdominal pain, diarrhea, nausea and vomiting  Genitourinary: Negative for difficulty urinating  Musculoskeletal: Positive for back pain (R lower back) and gait problem  Negative for joint swelling and myalgias  Skin: Negative for rash  Allergic/Immunologic: Negative for environmental allergies  Neurological: Negative for dizziness and headaches  Psychiatric/Behavioral: Negative            Current Medications       Current Outpatient Medications:   •  Adalimumab (Humira Pen) 80 MG/0 8ML PNKT, Inject 80 mg under the skin every 14 (fourteen) days Starting day 43, Disp: 2 each, Rfl: 11  •  Adalimumab (Humira Pen-CD/UC/HS Starter) 80 MG/0 8ML PNKT, Loading dose: 160 mg subcutaneous on day 1, 80 mg subcutaneous on days 15 and 29, Disp: 4 each, Rfl: 0  •  B Complex Vitamins (Vitamin B Complex) TABS, , Disp: , Rfl:   •  busPIRone (BUSPAR) 15 mg tablet, Take 15 mg by mouth 2 (two) times a day, Disp: , Rfl:   •  cetirizine (ZyrTEC) 10 mg tablet, Take 10 mg by mouth daily, Disp: , Rfl:   •  citalopram (CeleXA) 20 mg tablet, Take 20 mg by mouth daily, Disp: , Rfl:   •  Diclofenac Sodium (VOLTAREN) 1 %, , Disp: , Rfl:   •  doxycycline hyclate (VIBRAMYCIN) 100 mg capsule, Take 1 capsule (100 mg total) by mouth every 12 (twelve) hours for 14 days, Disp: 28 capsule, Rfl: 0  •  famotidine (PEPCID) 40 MG tablet, Take 40 mg by mouth daily, Disp: , Rfl:   •  losartan (COZAAR) 100 MG tablet, Take 100 mg by mouth daily, Disp: , Rfl:   •  metFORMIN (GLUCOPHAGE-XR) 500 mg 24 hr tablet, take 2 tablets by mouth twice a day with meals, Disp: , Rfl:   •  methocarbamol (ROBAXIN) 500 mg tablet, Take 1 tablet (500 mg total) by mouth 4 (four) times a day, Disp: 30 tablet, Rfl: 0  •  NP Thyroid 15 MG tablet, take 1 tablet by mouth IN ADDITION  MGS FOR A TOTAL  MGS FOR 7 DAYS, Disp: , Rfl:   •  Omega-3 1000 MG CAPS, Take 1 g by mouth 2 (two) times a day, Disp: , Rfl:   •  predniSONE 20 mg tablet, Take 2 tablets (40 mg total) by mouth daily for 5 days, Disp: 10 tablet, Rfl: 0  •  Prevalite 4 g packet, take 1 packet by mouth twice a day, Disp: , Rfl:   •  pyridoxine (B-6) 100 MG tablet, Take 100 mg by mouth daily, Disp: , Rfl:   •  thyroid (ARMOUR) 120 MG tablet, Take 120 mg by mouth daily, Disp: , Rfl:     Current Facility-Administered Medications:   •  ketorolac (TORADOL) injection 30 mg, 30 mg, Intramuscular, Once, Ardath Necessary, CRNP    Current Allergies     Allergies as of 06/26/2023 - Reviewed 06/26/2023   Allergen Reaction Noted   • Erythromycin Abdominal Pain, Other (See Comments), and Vomiting 09/06/2019   • Vancomycin Abdominal Pain, GI Intolerance, Other (See Comments), and Vomiting 09/06/2019            The following portions of the patient's history were reviewed and updated as appropriate: allergies, current medications, past family history, past medical history, past social history, past surgical history and problem list      Past Medical History:   Diagnosis Date   • Anxiety    • Depression    • Diabetes mellitus ([facility] [address] )    • Hypertension    • Hypothyroidism        History reviewed  No pertinent surgical history  History reviewed  No pertinent family history  Medications have been verified  Objective   /76   Pulse 72   Temp 97 8 °F (36 6 °C)   Resp 16   Wt 90 8 kg (200 lb 4 oz)   SpO2 96%   BMI 29 57 kg/m²        Physical Exam     Physical Exam  Vitals and nursing note reviewed  Constitutional:       General: She is not in acute distress  Appearance: Normal appearance  She is not ill-appearing  HENT:      Head: Normocephalic and atraumatic  Right Ear: External ear normal       Left Ear: External ear normal       Nose: Nose normal       Mouth/Throat:      Lips: Pink  Mouth: Mucous membranes are moist    Eyes:      General: Vision grossly intact        Extraocular Movements: Extraocular movements intact  Pupils: Pupils are equal, round, and reactive to light  Cardiovascular:      Rate and Rhythm: Normal rate and regular rhythm  Heart sounds: Normal heart sounds  No murmur heard  Pulmonary:      Effort: Pulmonary effort is normal  No respiratory distress  Breath sounds: Normal breath sounds  No decreased air movement  No decreased breath sounds, wheezing, rhonchi or rales  Musculoskeletal:      Cervical back: Normal range of motion  Lumbar back: Tenderness present  No bony tenderness  Decreased range of motion  Negative right straight leg raise test and negative left straight leg raise test         Back:    Skin:     General: Skin is warm  Findings: No rash  Comments: No skin lump/mass noted  Neurological:      Mental Status: She is alert and oriented to person, place, and time  Motor: Motor function is intact     Psychiatric:         Attention and Perception: Attention normal          Mood and Affect: Mood normal

## 2023-06-26 NOTE — PATIENT INSTRUCTIONS
Do not take NSAIDs while taking prednisone  May take tylenol as needed  Do not drive or operate heavy machinery while taking muscle relaxer  Over the counter topical pain medication rubs  Rest (for no longer than 24 hours)  Stretching exercises, see attached instructions  Alternate ice and heat  Consider physical therapy if no improvement after 1 week  Consider chiropractic treatments  Follow up with PCP in 3-5 days  Proceed to the ER with worsening symptoms, or if you experience loss of bowel/bladder function, numbness in groin (saddle anaesthesia)  Back Pain   AMBULATORY CARE:   Back pain  is common  You may have back pain and muscle spasms  You may feel sore or stiff on one or both sides of your back  The pain may spread to your lower body  Conditions that affect the spine, joints, or muscles can cause back pain  These may include arthritis, spinal stenosis (narrowing of the spinal column), muscle tension, or breakdown of the spinal discs  Call your local emergency number (911 in the [address] [facility]) if:   You have severe back pain with chest pain  You cannot control your urine or bowel movements  Your pain becomes so severe that you cannot walk  Seek care immediately if:   You have pain, numbness, or weakness in one or both legs  You have severe back pain, nausea, and vomiting  You have severe back pain that spreads to your side or genital area  Call your doctor if:   You have back pain that does not get better with rest and pain medicine  You have a fever  You have pain that worsens when you are on your back or when you rest     You have pain that worsens when you cough or sneeze  You lose weight without trying  You have questions or concerns about your condition or care  Medicines: You may need any of the following:  NSAIDs , such as ibuprofen, help decrease swelling, pain, and fever  This medicine is available with or without a doctor's order   NSAIDs can cause stomach bleeding or kidney problems in certain people  If you take blood thinner medicine, always ask your healthcare provider if NSAIDs are safe for you  Always read the medicine label and follow directions  Acetaminophen  decreases pain and fever  It is available without a doctor's order  Ask how much to take and how often to take it  Follow directions  Read the labels of all other medicines you are using to see if they also contain acetaminophen, or ask your doctor or pharmacist  Acetaminophen can cause liver damage if not taken correctly  Do not use more than 4 grams (4,000 milligrams) total of acetaminophen in one day  Muscle relaxers  help decrease muscle spasms and back pain  Take your medicine as directed  Contact your healthcare provider if you think your medicine is not helping or if you have side effects  Tell him or her if you are allergic to any medicine  Keep a list of the medicines, vitamins, and herbs you take  Include the amounts, and when and why you take them  Bring the list or the pill bottles to follow-up visits  Carry your medicine list with you in case of an emergency  Manage your back pain:   Apply ice  on your back for 15 to 20 minutes every hour or as directed  Use an ice pack, or put crushed ice in a plastic bag  Cover it with a towel before you apply it to your skin  Ice helps prevent tissue damage and decreases pain  Apply heat  on your back for 20 to 30 minutes every 2 hours for as many days as directed  Heat helps decrease pain and muscle spasms  Stay active  as much as you can without causing more pain  Bed rest could make your back pain worse  Avoid heavy lifting until your pain is gone  Go to physical therapy as directed  A physical therapist can teach you exercises to help improve movement and strength, and to decrease pain  Follow up with your doctor in 2 weeks, or as directed:   You might need to see a specialist  Write down your questions so you remember to ask them during your visits  © Copyright [employer] 2022 Information is for End User's use only and may not be sold, redistributed or otherwise used for commercial purposes  All illustrations and images included in CareNotes® are the copyrighted property of A D A M , Inc  or Prema Haines  The above information is an  only  It is not intended as medical advice for individual conditions or treatments  Talk to your doctor, nurse or pharmacist before following any medical regimen to see if it is safe and effective for you  Lower Back Exercises   WHAT YOU NEED TO KNOW:   Lower back exercises help heal and strengthen your back muscles to prevent another injury  Ask your healthcare provider if you need to see a physical therapist for more advanced exercises  DISCHARGE INSTRUCTIONS:   Return to the emergency department if:   You have severe pain that prevents you from moving  Contact your healthcare provider if:   Your pain becomes worse  You have new pain  You have questions or concerns about your condition or care  Do lower back exercises safely:   Do the exercises on a mat or firm surface  (not on a bed) to support your spine and prevent low back pain  Move slowly and smoothly  Avoid fast or jerky motions  Breathe normally  Do not hold your breath  Stop if you feel pain  It is normal to feel some discomfort at first  Regular exercise will help decrease your discomfort over time  Lower back exercises: Your healthcare provider may recommend that you do back exercises 10 to 30 minutes each day  He may also recommend that you do exercises 1 to 3 times each day  Ask your healthcare provider which exercises are best for you and how often to do them  Ankle pumps:  Lie on your back  Move your foot up (with your toes pointing toward your head)  Then, move your foot down (with your toes pointing away from you)  Repeat this exercise 10 times on each side           Heel slides:  Lie on your back  Slowly bend one leg and then straighten it  Next, bend the other leg and then straighten it  Repeat 10 times on each side  Pelvic tilt:  Lie on your back with your knees bent and feet flat on the floor  Place your arms in a relaxed position beside your body  Tighten the muscles of your abdomen and flatten your back against the floor  Hold for 5 seconds  Repeat 5 times  Back stretch:  Lie on your back with your hands behind your head  Bend your knees and turn the lower half of your body to one side  Hold this position for 10 seconds  Repeat 3 times on each side  Straight leg raises:  Lie on your back with one leg straight  Bend the other knee  Tighten your abdomen and then slowly lift the straight leg up about 6 to 12 inches off the floor  Hold for 1 to 5 seconds  Lower your leg slowly  Repeat 10 times on each leg  Knee-to-chest:  Lie on your back with your knees bent and feet flat on the floor  Pull one of your knees toward your chest and hold it there for 5 seconds  Return your leg to the starting position  Lift the other knee toward your chest and hold for 5 seconds  Do this 5 times on each side  Cat and camel:  Place your hands and knees on the floor  Arch your back upward toward the ceiling and lower your head  Round out your spine as much as you can  Hold for 5 seconds  Lift your head upward and push your chest downward toward the floor  Hold for 5 seconds  Do 3 sets or as directed  Wall squats:  Stand with your back against a wall  Tighten the muscles of your abdomen  Slowly lower your body until your knees are bent at a 45 degree angle  Hold this position for 5 seconds  Slowly move back up to a standing position  Repeat 10 times  Curl up:  Lie on your back with your knees bent and feet flat on the floor  Place your hands, palms down, underneath the curve in your lower back   Next, with your elbows on the floor, lift your shoulders and chest 2 to 3 inches  Keep your head in line with your shoulders  Hold this position for 5 seconds  When you can do this exercise without pain for 10 to 15 seconds, you may add a rotation  While your shoulders and chest are lifted off the ground, turn slightly to the left and hold  Repeat on the other side  Bird dog:  Place your hands and knees on the floor  Keep your wrists directly below your shoulders and your knees directly below your hips  Pull your belly button in toward your spine  Do not flatten or arch your back  Tighten your abdominal muscles  Raise one arm straight out so that it is aligned with your head  Next, raise the leg opposite your arm  Hold this position for 15 seconds  Lower your arm and leg slowly and change sides  Do 5 sets  © Copyright [employer] 2022 Information is for End User's use only and may not be sold, redistributed or otherwise used for commercial purposes  All illustrations and images included in CareNotes® are the copyrighted property of A D A M , Inc  or [facility] Esvin Sullivan   The above information is an  only  It is not intended as medical advice for individual conditions or treatments  Talk to your doctor, nurse or pharmacist before following any medical regimen to see if it is safe and effective for you

## 2023-08-22 ENCOUNTER — TELEPHONE (OUTPATIENT)
Dept: DERMATOLOGY | Facility: CLINIC | Age: 48
End: 2023-08-22

## 2023-08-22 NOTE — TELEPHONE ENCOUNTER
Patient called to cancel appointment for 10/03/2023 - / Humira follow up. [place]. I advised patient that there's no appointments available till the end of the year. I also recommended to please call office back once she returns to set up a follow up before her Humira prior authorization expires on April 2024 for continuation of refills     Patient understands and message was routed to Dr. Severino Villa as well

## 2023-10-09 ENCOUNTER — TELEPHONE (OUTPATIENT)
Age: 48
End: 2023-10-09

## 2023-10-09 NOTE — TELEPHONE ENCOUNTER
Lavinia Len called [facility] to confirm patient's insurance information , they have a prescription for humira but insurance is inactive since 09/2023 . [facility] needs to call [facility] to update insurance information. Called patient and made her aware , I also gave her pharmacy's phone number [phone] and fax number [phone].

## 2023-10-10 ENCOUNTER — TELEPHONE (OUTPATIENT)
Dept: DERMATOLOGY | Facility: CLINIC | Age: 48
End: 2023-10-10

## 2023-10-17 ENCOUNTER — TELEPHONE (OUTPATIENT)
Age: 48
End: 2023-10-17

## 2023-10-17 NOTE — TELEPHONE ENCOUNTER
Yosvany Cason ,   Patient is aware , I spoke with her on  10/09/2023 she will contact pharmacy as soon as she has coverage .

## 2023-10-17 NOTE — TELEPHONE ENCOUNTER
Rec'd call from [facility] stating that they have tried contact patient 4x regarding insurance (Cinthia Valdes) being terminated. They have had no response. [phone] option 2    Thank you.

## 2024-01-08 ENCOUNTER — APPOINTMENT (OUTPATIENT)
Dept: LAB | Facility: CLINIC | Age: 49
End: 2024-01-08
Payer: COMMERCIAL

## 2024-01-08 ENCOUNTER — APPOINTMENT (OUTPATIENT)
Dept: RADIOLOGY | Facility: CLINIC | Age: 49
End: 2024-01-08
Payer: COMMERCIAL

## 2024-01-08 DIAGNOSIS — G89.29 CHRONIC IDIOPATHIC ANAL PAIN: ICD-10-CM

## 2024-01-08 DIAGNOSIS — M67.01 ACHILLES TENDON CONTRACTURE, RIGHT: ICD-10-CM

## 2024-01-08 DIAGNOSIS — K62.89 CHRONIC IDIOPATHIC ANAL PAIN: ICD-10-CM

## 2024-01-08 DIAGNOSIS — M54.50 LOW BACK PAIN, UNSPECIFIED BACK PAIN LATERALITY, UNSPECIFIED CHRONICITY, UNSPECIFIED WHETHER SCIATICA PRESENT: ICD-10-CM

## 2024-01-08 DIAGNOSIS — L73.2 HIDRADENITIS: ICD-10-CM

## 2024-01-08 DIAGNOSIS — R76.8 FALSE POSITIVE SEROLOGICAL TEST FOR SYPHILIS: ICD-10-CM

## 2024-01-08 LAB
AMORPH URATE CRY URNS QL MICRO: ABNORMAL
BACTERIA UR QL AUTO: ABNORMAL /HPF
BILIRUB UR QL STRIP: NEGATIVE
C3 SERPL-MCNC: 183 MG/DL (ref 87–200)
C4 SERPL-MCNC: 36 MG/DL (ref 19–52)
CLARITY UR: ABNORMAL
COLOR UR: YELLOW
GLUCOSE UR STRIP-MCNC: ABNORMAL MG/DL
HGB UR QL STRIP.AUTO: ABNORMAL
KETONES UR STRIP-MCNC: NEGATIVE MG/DL
LEUKOCYTE ESTERASE UR QL STRIP: ABNORMAL
MUCOUS THREADS UR QL AUTO: ABNORMAL
NITRITE UR QL STRIP: NEGATIVE
NON-SQ EPI CELLS URNS QL MICRO: ABNORMAL /HPF
PH UR STRIP.AUTO: 6 [PH]
PROT UR STRIP-MCNC: ABNORMAL MG/DL
RBC #/AREA URNS AUTO: ABNORMAL /HPF
SP GR UR STRIP.AUTO: 1.02 (ref 1–1.03)
UROBILINOGEN UR STRIP-ACNC: <2 MG/DL
WBC #/AREA URNS AUTO: ABNORMAL /HPF

## 2024-01-08 PROCEDURE — 72200 X-RAY EXAM SI JOINTS: CPT

## 2024-01-08 PROCEDURE — 86200 CCP ANTIBODY: CPT

## 2024-01-08 PROCEDURE — 86160 COMPLEMENT ANTIGEN: CPT

## 2024-01-08 PROCEDURE — 81001 URINALYSIS AUTO W/SCOPE: CPT

## 2024-01-08 PROCEDURE — 36415 COLL VENOUS BLD VENIPUNCTURE: CPT

## 2024-01-09 LAB — CCP AB SER IA-ACNC: 1.2

## 2024-01-24 ENCOUNTER — HOSPITAL ENCOUNTER (OUTPATIENT)
Dept: ULTRASOUND IMAGING | Facility: HOSPITAL | Age: 49
Discharge: HOME | End: 2024-01-24
Payer: COMMERCIAL

## 2024-01-24 DIAGNOSIS — R22.2 LUMP OF SKIN OF BACK: ICD-10-CM

## 2024-01-24 PROCEDURE — 76705 ECHO EXAM OF ABDOMEN: CPT

## 2024-03-04 ENCOUNTER — APPOINTMENT (OUTPATIENT)
Dept: RADIOLOGY | Facility: CLINIC | Age: 49
End: 2024-03-04
Payer: COMMERCIAL

## 2024-03-04 ENCOUNTER — APPOINTMENT (OUTPATIENT)
Dept: LAB | Facility: CLINIC | Age: 49
End: 2024-03-04
Payer: COMMERCIAL

## 2024-03-04 DIAGNOSIS — C73 MALIGNANT NEOPLASM OF THYROID GLAND (HCC): ICD-10-CM

## 2024-03-04 DIAGNOSIS — R20.0 TACTILE ANESTHESIA: ICD-10-CM

## 2024-03-04 LAB
T3 SERPL-MCNC: 1 NG/ML
T4 FREE SERPL-MCNC: 2.25 NG/DL (ref 0.61–1.12)
TSH SERPL DL<=0.05 MIU/L-ACNC: 0.52 UIU/ML (ref 0.45–4.5)

## 2024-03-04 PROCEDURE — 84439 ASSAY OF FREE THYROXINE: CPT

## 2024-03-04 PROCEDURE — 84480 ASSAY TRIIODOTHYRONINE (T3): CPT

## 2024-03-04 PROCEDURE — 84443 ASSAY THYROID STIM HORMONE: CPT

## 2024-03-04 PROCEDURE — 72050 X-RAY EXAM NECK SPINE 4/5VWS: CPT

## 2024-03-04 PROCEDURE — 36415 COLL VENOUS BLD VENIPUNCTURE: CPT

## 2024-06-05 ENCOUNTER — OFFICE VISIT (OUTPATIENT)
Dept: DERMATOLOGY | Facility: CLINIC | Age: 49
End: 2024-06-05
Payer: COMMERCIAL

## 2024-06-05 VITALS — TEMPERATURE: 97.7 F | HEIGHT: 69 IN | BODY MASS INDEX: 29.41 KG/M2 | WEIGHT: 198.6 LBS

## 2024-06-05 DIAGNOSIS — L73.2 HIDRADENITIS SUPPURATIVA: Primary | ICD-10-CM

## 2024-06-05 PROCEDURE — 99213 OFFICE O/P EST LOW 20 MIN: CPT | Performed by: DERMATOLOGY

## 2024-06-05 NOTE — PATIENT INSTRUCTIONS
FOLLOW UP HIDRADENITIS SUPPURATIVA    Physical Exam:  Anatomic Location: underarms (axilla)  suprapubic  groin area (inguinal)  Morphologic Description:  Boil-like nodules and abscesses  Gipson Stage: Stage II (MODERATE):  Recurrent abscesses and nodules WITH sinus tract formation or scarring: single or multiple widely  lesions.  Skin scar contracture or restricted range of motion present?  YES  Pertinent Positives:  Pertinent Negatives:    Additional History of Present Condition:  Patient was last seen 6/15/23. Kenalog injection done at that time. Patient was to continue Doxycycline, Humira injections 80 mg every 14 days. De ryann procedure was discussed as well. One active lesion on abdomen. Left Axilla tender, right groin did open a week ago but no pain. Washing with Hibiclens. Not taking oral Doxycycline and last Humira was November 2023. Humira was very helpful. Insurance issues was the reason patient stopped Humira.   History of diabetes? YES  Current smoker? No  Impacting social life or ability to work?  YES  Change in range of motion?  YES    Plan:  Hidradenitis suppurative is an autoimmune condition. It is not infectious in nature and is not the result of poor hygiene.  Discussed the chronic nature of the condition and the potential for flare-ups and recurrence. Chronic flare-ups may result in scarring of the skin. Flare-ups may worsen around the time of menstruation, and hormonal therapy may provide benefit. The risk of flare-ups can be lowered by maintaining a healthy weight, not smoking, staying out of hot and humid climates, not wearing tight clothing, and decreasing stress.  Hidradenitis suppurativa may be associated with feelings of negativity. Seek help if symptoms of depression or anxiety develop.  Apply simple dressings to draining sinuses. If prone to secondary infection, wash with antiseptics or take bleach baths. If severe pain and signs of infection occur, then call our office and/or  "go to the Emergency Room.  Biologic Therapy: HUMIRA (Adalimumab)  Humira is only approved for HS patients with MODERATE or SEVERE disease.  Document severity in the note.  \"ADULT DOSE\":  12+ years of age + POUNDS (60+ KILOGRAMS)  INITIAL LOADING DOSE ONLY:  Administer 160 mg TOTAL (two 80 mg pens) on DAY #1.  Then, administer 80 mg TOTAL (single 80 mg pen) on DAY #15.  [In Epic, order \"HS Adult Starter Pack:  3-count HUMIRA Pen 80mg/0.8mL; NDC:  1054-5665-41]  \"EVERY 2 WEEK\" MAINTENANCE DOSING:  Starting on DAY #29, administer 80 mg TOTAL (single 80 mg pen) and continue to do so \"EVERY OTHER WEEK.\"  [In Epic, order \"Pen Carton:  2-count HUMIRA Pen 80mg/0.8mL; NDC:  1075-5232-23]  Lab Evaluation:  Quantiferon Gold ordered today for Tuberculosis testing.  The first injection should be given under the supervision of a healthcare professional. A patient may self-inject HUMIRA after appropriate training and monitoring by a healthcare professional.   INJECTION SITE CARE:  We discussed NOT starting or continuing to inject HUMIRA during an active infection, including localized infections.  Injection site reactions (redness and/or itching, bleeding, pain, or swelling) were reported in 20% of HUMIRA-treated patients vs 14% of placebo-treated patients. Most reactions were mild and did not require discontinuation.  We discussed the importance of rotating injection sites and not to inject into areas where the skin is tender, bruised, red, or hard.   MOST COMMON ADVERSE EVENTS:  The most common adverse reactions in HUMIRA clinical trials (incidence >10%) were: infections (eg, upper respiratory, sinusitis), injection site reactions, headache, and rash.  Patients older than 65 years, patients with co-morbid conditions, and/or patients taking concomitant immunosuppressants may be at greater risk of infection.  INFECTION RISK:  Reported infections include:  (I) Active tuberculosis (TB), including reactivation of latent TB. " Patients with TB have frequently presented with disseminated or extrapulmonary disease. Initiate treatment for latent TB prior to HUMIRA use.  (II) Invasive fungal infections, including histoplasmosis, coccidioidomycosis, candidiasis, aspergillosis, blastomycosis, and pneumocystosis. Patients with histoplasmosis or other invasive fungal infections may present with disseminated, rather than localized, disease. Antigen and antibody testing for histoplasmosis may be negative in some patients with active infection. Consider empiric anti-fungal therapy in patients at risk for invasive fungal infections who develop severe systemic illness.  (III) Bacterial, viral, and other infections due to opportunistic pathogens, including Legionella and Listeria.  We discussed discontinuing HUMIRA in any patient who develops a serious infection or sepsis.   DRUG INTERACTIONS WITH BIOLOGICS:  A higher rate of serious infections has been observed in RA patients treated with rituximab who received subsequent treatment with a TNF blocker. An increased risk of serious infections has been seen with the combination of TNF blockers with anakinra or abatacept, with no demonstrated added benefit in patients with RA. Concomitant administration of HUMIRA with other biologic DMARDs (e.g., anakinra or abatacept) or other TNF blockers is not recommended based on the possible increased risk for infections and other potential pharmacological interactions.  CANCER RISK:  Lymphoma and other malignancies, some fatal, have been reported in children and adolescent patients treated with TNF blockers, including HUMIRA. Postmarketing cases of hepatosplenic T-cell lymphoma (HSTCL), a rare type of T-cell lymphoma, have been reported in patients treated with TNF blockers, including HUMIRA. These cases have had a very aggressive disease course and have been fatal. The majority of reported TNF blocker cases have occurred in patients with Crohn's disease or  ulcerative colitis and the majority were in adolescent and young adult males. Almost all of these patients had received treatment with azathioprine or 6-mercaptopurine concomitantly with a TNF blocker at or prior to diagnosis. It is uncertain whether the occurrence of HSTCL is related to use of a TNF blocker or a TNF blocker in combination with these other immunosuppressants.  We discussed and considered the risks and benefits of HUMIRA treatment prior to initiating or continuing therapy in any patient with known malignancy.   In HUMIRA clinical trials, there was an approximate 3-fold higher rate of lymphoma than expected in the general [place] population. Patients with chronic inflammatory diseases, particularly those with highly active disease and/or chronic exposure to immunosuppressant therapies, may be at higher risk of lymphoma than the general population, even in the absence of TNF blockers.  Approximately half of the postmarketing cases of malignancies in children, adolescents, and young adults receiving TNF blockers were lymphomas; other cases included rare malignancies associated with immunosuppression and malignancies not usually observed in children and adolescents.  We discussed and considered the risks and benefits of HUMIRA treatment prior to initiating or continuing therapy in any patient withOUT known malignancy.   Non-melanoma skin cancer (NMSC) was reported during clinical trials for HUMIRA-treated patients. We discussed the importance of examining all patients, particularly those with a history of prolonged immunosuppressant or PUVA therapy, for the presence of NMSC prior to and during treatment with HUMIRA.  HYPERSENSITIVITY:  Anaphylaxis and angioneurotic edema have been reported following HUMIRA administration. If a serious allergic reaction occurs, stop HUMIRA and institute appropriate therapy.  HEPATITIS B VIRUS REACTIVATION:  Use of TNF blockers, including HUMIRA, may increase the risk of  reactivation of hepatitis B virus (HBV) in patients who are chronic carriers. Some cases have been fatal.  We discussed the importance of evaluating patients at risk for HBV infection for prior evidence of HBV infection before initiating TNF blocker therapy.  We discussed the importance of exercising caution in patients who are carriers of HBV and monitor them during and after HUMIRA treatment.  We discussed discontinuing HUMIRA and beginning antiviral therapy in patients who develop HBV reactivation and exercising caution if and when resuming HUMIRA after HBV treatment.  NEUROLOGIC REACTIONS:  TNF blockers, including HUMIRA, have been associated with rare cases of new onset or exacerbation of central nervous system and peripheral demyelinating diseases, including multiple sclerosis, optic neuritis, and Guillain-Barré syndrome.  There is a known association between intermediate uveitis and central demyelinating disorders.We discussed the need to exercise caution when considering HUMIRA for patients with these disorders; discontinuation of HUMIRA should be considered if any of these disorders develop.  HEMATOLOGIC REACTIONS:  We discussed that rare reports of pancytopenia, including aplastic anemia, have been reported with TNF blockers. Medically significant cytopenia has been infrequently reported with HUMIRA.  We discussed that we would need to consider stopping HUMIRA if significant hematologic abnormalities occur.  CONGESTIVE HEART FAILURE RISK:  Worsening and new onset congestive heart failure (CHF) has been reported with TNF blockers. Cases of worsening CHF have been observed with HUMIRA.  We discussed the need to exercise caution and monitor carefully.  AUTOIMMUNITY RISK:  Treatment with HUMIRA may result in the formation of autoantibodies and, rarely, in development of a lupus-like syndrome. We discussed the need to discontinue treatment if symptoms of a lupus-like syndrome develop.  IMMUNIZATIONS:  We  discussed that patients on HUMIRA should not receive live vaccines and that PEDIATRIC patients, if possible, should be brought up to date with all immunizations before initiating HUMIRA therapy.  We also discussed that HUMIRA is actively transferred across the placenta during the third trimester of pregnancy and may affect immune response in the in utero exposed infant. The safety of administering live or live-attenuated vaccines in infants exposed to HUMIRA in utero is unknown. Risks and benefits should be considered prior to vaccinating (live or live-attenuated) exposed infants.      PROCEDURES PERFORMED TODAY ASSOCIATED WITH THIS CONDITION:          NONE

## 2024-06-05 NOTE — PROGRESS NOTES
"[facility] Dermatology Clinic Note     Patient Name: Abigail Paulino  Encounter Date: 6/5/2024     Have you been cared for by a [facility] Dermatologist in the last 3 years and, if so, which description applies to you?    Yes.  I have been here within the last 3 years, and my medical history has NOT changed since that time.  I am FEMALE/of child-bearing potential.    REVIEW OF SYSTEMS:  Have you recently had or currently have any of the following? No changes in my recent health.   PAST MEDICAL HISTORY:  Have you personally ever had or currently have any of the following?  If \"YES,\" then please provide more detail. No changes in my medical history.   HISTORY OF IMMUNOSUPPRESSION: Do you have a history of any of the following:  Systemic Immunosuppression such as Diabetes, Biologic or Immunotherapy, Chemotherapy, Organ Transplantation, Bone Marrow Transplantation?  YES, Hidradenitis Suppurativa     Answering \"YES\" requires the addition of the dotphrase \"IMMUNOSUPPRESSED\" as the first diagnosis of the patient's visit.   FAMILY HISTORY:  Any \"first degree relatives\" (parent, brother, sister, or child) with the following?    No changes in my family's known health.   PATIENT EXPERIENCE:    Do you want the Dermatologist to perform a COMPLETE skin exam today including a clinical examination under the \"bra and underwear\" areas?  NO  If necessary, do we have your permission to call and leave a detailed message on your Preferred Phone number that includes your specific medical information?  Yes      Allergies   Allergen Reactions    Erythromycin Abdominal Pain, Other (See Comments) and Vomiting     vomit      Vancomycin Abdominal Pain, GI Intolerance, Other (See Comments) and Vomiting     vomiting        Current Outpatient Medications:     Adalimumab (Humira Pen) 80 MG/0.8ML PNKT, Inject 80 mg under the skin every 14 (fourteen) days Starting day 43, Disp: 2 each, Rfl: 11    Adalimumab (Humira Pen-CD/UC/HS Starter) 80 MG/0.8ML " PNKT, Loading dose: 160 mg subcutaneous on day 1, 80 mg subcutaneous on days 15 and 29, Disp: 4 each, Rfl: 0    B Complex Vitamins (Vitamin B Complex) TABS, , Disp: , Rfl:     busPIRone (BUSPAR) 15 mg tablet, Take 15 mg by mouth 2 (two) times a day, Disp: , Rfl:     cetirizine (ZyrTEC) 10 mg tablet, Take 10 mg by mouth daily, Disp: , Rfl:     citalopram (CeleXA) 20 mg tablet, Take 20 mg by mouth daily, Disp: , Rfl:     Diclofenac Sodium (VOLTAREN) 1 %, , Disp: , Rfl:     famotidine (PEPCID) 40 MG tablet, Take 40 mg by mouth daily, Disp: , Rfl:     losartan (COZAAR) 100 MG tablet, Take 100 mg by mouth daily, Disp: , Rfl:     metFORMIN (GLUCOPHAGE-XR) 500 mg 24 hr tablet, take 2 tablets by mouth twice a day with meals, Disp: , Rfl:     methocarbamol (ROBAXIN) 500 mg tablet, Take 1 tablet (500 mg total) by mouth 4 (four) times a day, Disp: 30 tablet, Rfl: 0    NP Thyroid 15 MG tablet, take 1 tablet by mouth IN ADDITION  MGS FOR A TOTAL  MGS FOR 7 DAYS, Disp: , Rfl:     Omega-3 1000 MG CAPS, Take 1 g by mouth 2 (two) times a day, Disp: , Rfl:     Prevalite 4 g packet, take 1 packet by mouth twice a day, Disp: , Rfl:     pyridoxine (B-6) 100 MG tablet, Take 100 mg by mouth daily, Disp: , Rfl:     thyroid (ARMOUR) 120 MG tablet, Take 120 mg by mouth daily, Disp: , Rfl:           Whom besides the patient is providing clinical information about today's encounter?   NO ADDITIONAL HISTORIAN (patient alone provided history)    Physical Exam and Assessment/Plan by Diagnosis:      FOLLOW UP HIDRADENITIS SUPPURATIVA    Physical Exam:  Anatomic Location: underarms (axilla)  suprapubic  groin area (inguinal)  Morphologic Description:  Boil-like nodules and abscesses  Gipson Stage: Stage II (MODERATE):  Recurrent abscesses and nodules WITH sinus tract formation or scarring: single or multiple widely  lesions.  Skin scar contracture or restricted range of motion present?  YES  Pertinent Positives:  Pertinent  "Negatives:    Additional History of Present Condition:  Patient was last seen 6/15/23. Kenalog injection done at that time. Patient was to continue Doxycycline, Humira injections 80 mg every 14 days. De ryann procedure was discussed as well. One active lesion on abdomen. Left Axilla tender, right groin did open a week ago but no pain. Washing with Hibiclens. Not taking oral Doxycycline and last Humira was November 2023. Humira was very helpful. Insurance issues was the reason patient stopped Humira.   History of diabetes? YES  Current smoker? No  Impacting social life or ability to work?  YES  Change in range of motion?  YES    Plan:  Hidradenitis suppurative is an autoimmune condition. It is not infectious in nature and is not the result of poor hygiene.  Discussed the chronic nature of the condition and the potential for flare-ups and recurrence. Chronic flare-ups may result in scarring of the skin. Flare-ups may worsen around the time of menstruation, and hormonal therapy may provide benefit. The risk of flare-ups can be lowered by maintaining a healthy weight, not smoking, staying out of hot and humid climates, not wearing tight clothing, and decreasing stress.  Hidradenitis suppurativa may be associated with feelings of negativity. Seek help if symptoms of depression or anxiety develop.  Apply simple dressings to draining sinuses. If prone to secondary infection, wash with antiseptics or take bleach baths. If severe pain and signs of infection occur, then call our office and/or go to the Emergency Room.  Biologic Therapy: HUMIRA (Adalimumab)  Humira is only approved for HS patients with MODERATE or SEVERE disease.  Document severity in the note.  \"ADULT DOSE\":  12+ years of age + POUNDS (60+ KILOGRAMS)  INITIAL LOADING DOSE ONLY:  Administer 160 mg TOTAL (two 80 mg pens) on DAY #1.  Then, administer 80 mg TOTAL (single 80 mg pen) on DAY #15.  [In Epic, order \"HS Adult Starter Pack:  3-count HUMIRA Pen " "80mg/0.8mL; NDC:  6297-4988-42]  \"EVERY 2 WEEK\" MAINTENANCE DOSING:  Starting on DAY #29, administer 80 mg TOTAL (single 80 mg pen) and continue to do so \"EVERY OTHER WEEK.\"  [In Epic, order \"Pen Carton:  2-count HUMIRA Pen 80mg/0.8mL; NDC:  2056-5263-83]  Lab Evaluation:  Quantiferon Gold ordered today for Tuberculosis testing.  The first injection should be given under the supervision of a healthcare professional. A patient may self-inject HUMIRA after appropriate training and monitoring by a healthcare professional.   INJECTION SITE CARE:  We discussed NOT starting or continuing to inject HUMIRA during an active infection, including localized infections.  Injection site reactions (redness and/or itching, bleeding, pain, or swelling) were reported in 20% of HUMIRA-treated patients vs 14% of placebo-treated patients. Most reactions were mild and did not require discontinuation.  We discussed the importance of rotating injection sites and not to inject into areas where the skin is tender, bruised, red, or hard.   MOST COMMON ADVERSE EVENTS:  The most common adverse reactions in HUMIRA clinical trials (incidence >10%) were: infections (eg, upper respiratory, sinusitis), injection site reactions, headache, and rash.  Patients older than 65 years, patients with co-morbid conditions, and/or patients taking concomitant immunosuppressants may be at greater risk of infection.  INFECTION RISK:  Reported infections include:  (I) Active tuberculosis (TB), including reactivation of latent TB. Patients with TB have frequently presented with disseminated or extrapulmonary disease. Initiate treatment for latent TB prior to HUMIRA use.  (II) Invasive fungal infections, including histoplasmosis, coccidioidomycosis, candidiasis, aspergillosis, blastomycosis, and pneumocystosis. Patients with histoplasmosis or other invasive fungal infections may present with disseminated, rather than localized, disease. Antigen and antibody testing " for histoplasmosis may be negative in some patients with active infection. Consider empiric anti-fungal therapy in patients at risk for invasive fungal infections who develop severe systemic illness.  (III) Bacterial, viral, and other infections due to opportunistic pathogens, including Legionella and Listeria.  We discussed discontinuing HUMIRA in any patient who develops a serious infection or sepsis.   DRUG INTERACTIONS WITH BIOLOGICS:  A higher rate of serious infections has been observed in RA patients treated with rituximab who received subsequent treatment with a TNF blocker. An increased risk of serious infections has been seen with the combination of TNF blockers with anakinra or abatacept, with no demonstrated added benefit in patients with RA. Concomitant administration of HUMIRA with other biologic DMARDs (e.g., anakinra or abatacept) or other TNF blockers is not recommended based on the possible increased risk for infections and other potential pharmacological interactions.  CANCER RISK:  Lymphoma and other malignancies, some fatal, have been reported in children and adolescent patients treated with TNF blockers, including HUMIRA. Postmarketing cases of hepatosplenic T-cell lymphoma (HSTCL), a rare type of T-cell lymphoma, have been reported in patients treated with TNF blockers, including HUMIRA. These cases have had a very aggressive disease course and have been fatal. The majority of reported TNF blocker cases have occurred in patients with Crohn's disease or ulcerative colitis and the majority were in adolescent and young adult males. Almost all of these patients had received treatment with azathioprine or 6-mercaptopurine concomitantly with a TNF blocker at or prior to diagnosis. It is uncertain whether the occurrence of HSTCL is related to use of a TNF blocker or a TNF blocker in combination with these other immunosuppressants.  We discussed and considered the risks and benefits of HUMIRA  treatment prior to initiating or continuing therapy in any patient with known malignancy.   In HUMIRA clinical trials, there was an approximate 3-fold higher rate of lymphoma than expected in the general [place] population. Patients with chronic inflammatory diseases, particularly those with highly active disease and/or chronic exposure to immunosuppressant therapies, may be at higher risk of lymphoma than the general population, even in the absence of TNF blockers.  Approximately half of the postmarketing cases of malignancies in children, adolescents, and young adults receiving TNF blockers were lymphomas; other cases included rare malignancies associated with immunosuppression and malignancies not usually observed in children and adolescents.  We discussed and considered the risks and benefits of HUMIRA treatment prior to initiating or continuing therapy in any patient withOUT known malignancy.   Non-melanoma skin cancer (NMSC) was reported during clinical trials for HUMIRA-treated patients. We discussed the importance of examining all patients, particularly those with a history of prolonged immunosuppressant or PUVA therapy, for the presence of NMSC prior to and during treatment with HUMIRA.  HYPERSENSITIVITY:  Anaphylaxis and angioneurotic edema have been reported following HUMIRA administration. If a serious allergic reaction occurs, stop HUMIRA and institute appropriate therapy.  HEPATITIS B VIRUS REACTIVATION:  Use of TNF blockers, including HUMIRA, may increase the risk of reactivation of hepatitis B virus (HBV) in patients who are chronic carriers. Some cases have been fatal.  We discussed the importance of evaluating patients at risk for HBV infection for prior evidence of HBV infection before initiating TNF blocker therapy.  We discussed the importance of exercising caution in patients who are carriers of HBV and monitor them during and after HUMIRA treatment.  We discussed discontinuing HUMIRA and  beginning antiviral therapy in patients who develop HBV reactivation and exercising caution if and when resuming HUMIRA after HBV treatment.  NEUROLOGIC REACTIONS:  TNF blockers, including HUMIRA, have been associated with rare cases of new onset or exacerbation of central nervous system and peripheral demyelinating diseases, including multiple sclerosis, optic neuritis, and Guillain-Barré syndrome.  There is a known association between intermediate uveitis and central demyelinating disorders.We discussed the need to exercise caution when considering HUMIRA for patients with these disorders; discontinuation of HUMIRA should be considered if any of these disorders develop.  HEMATOLOGIC REACTIONS:  We discussed that rare reports of pancytopenia, including aplastic anemia, have been reported with TNF blockers. Medically significant cytopenia has been infrequently reported with HUMIRA.  We discussed that we would need to consider stopping HUMIRA if significant hematologic abnormalities occur.  CONGESTIVE HEART FAILURE RISK:  Worsening and new onset congestive heart failure (CHF) has been reported with TNF blockers. Cases of worsening CHF have been observed with HUMIRA.  We discussed the need to exercise caution and monitor carefully.  AUTOIMMUNITY RISK:  Treatment with HUMIRA may result in the formation of autoantibodies and, rarely, in development of a lupus-like syndrome. We discussed the need to discontinue treatment if symptoms of a lupus-like syndrome develop.  IMMUNIZATIONS:  We discussed that patients on HUMIRA should not receive live vaccines and that PEDIATRIC patients, if possible, should be brought up to date with all immunizations before initiating HUMIRA therapy.  We also discussed that HUMIRA is actively transferred across the placenta during the third trimester of pregnancy and may affect immune response in the in utero exposed infant. The safety of administering live or live-attenuated vaccines in  infants exposed to HUMIRA in utero is unknown. Risks and benefits should be considered prior to vaccinating (live or live-attenuated) exposed infants.      PROCEDURES PERFORMED TODAY ASSOCIATED WITH THIS CONDITION:          NONE     Medical Complexity:    CHRONIC ILLNESS (expected duration of >1 year):  EXACERBATION, PROGRESSION, OR SIDE EFFECTS OF TREATMENT.  Acutely worsening, poorly controlled, or progressing.  Intent is to control progression and requires additional supportive care or attention to treatment for side effects but not at level of consideration of hospital level of care.          Scribe Attestation      I,:  Sujey Arias am acting as a scribe while in the presence of the attending physician.:       I,:  Brandie Santizo MD personally performed the services described in this documentation    as scribed in my presence.:

## 2024-06-07 ENCOUNTER — APPOINTMENT (OUTPATIENT)
Dept: LAB | Facility: CLINIC | Age: 49
End: 2024-06-07
Payer: COMMERCIAL

## 2024-06-07 DIAGNOSIS — L73.2 HIDRADENITIS SUPPURATIVA: ICD-10-CM

## 2024-06-07 PROCEDURE — 36415 COLL VENOUS BLD VENIPUNCTURE: CPT

## 2024-06-07 PROCEDURE — 86480 TB TEST CELL IMMUN MEASURE: CPT

## 2024-06-08 LAB
GAMMA INTERFERON BACKGROUND BLD IA-ACNC: 0.04 IU/ML
M TB IFN-G BLD-IMP: NEGATIVE
M TB IFN-G CD4+ BCKGRND COR BLD-ACNC: -0.01 IU/ML
M TB IFN-G CD4+ BCKGRND COR BLD-ACNC: 0 IU/ML
MITOGEN IGNF BCKGRD COR BLD-ACNC: 9.96 IU/ML

## 2024-06-27 ENCOUNTER — TELEPHONE (OUTPATIENT)
Dept: DERMATOLOGY | Facility: CLINIC | Age: 49
End: 2024-06-27

## 2024-06-27 NOTE — TELEPHONE ENCOUNTER
New insurance, [facility] for humira please    Hidradenitis suppurativa. Starter kit. Inject 160 mg subcutaneously on day 1. Inject 80 mg every 2 weeks starting on day 15.

## 2024-07-02 NOTE — TELEPHONE ENCOUNTER
PA for Humira 80mg auto injector    Submitted via    []CMM-KEY   [][employer]-Case ID #   []Faxed to plan   [x]Other website [employer] portal- Prior Auth (EOC) ID:  [id number]  []Phone call Case ID #     Office notes sent, clinical questions answered. Awaiting determination    Turnaround time for your insurance to make a decision on your Prior Authorization can take 7-21 business days.

## 2024-08-06 ENCOUNTER — TELEPHONE (OUTPATIENT)
Age: 49
End: 2024-08-06

## 2024-08-06 NOTE — TELEPHONE ENCOUNTER
[facility] called back to verify the directions on the Humira: Inject 160 mg subcutaneously on day 1. Inject 80 mg every 2 weeks syarting on day 15.

## 2024-08-06 NOTE — TELEPHONE ENCOUNTER
Tried to reach out to [facility] pharmacy to let them know the patient is approved for : inject 160 mg subcutaneously on day 1. Inject  80 mg every 2 weeks starting on day 15. All the pharmacist were busy so they will call back.

## 2024-08-06 NOTE — TELEPHONE ENCOUNTER
[facility]    Inquiring about prescriptions sent for patient's viola    Stated it came over for the starter pack but looks like it is a regular maintenance pack.

## 2024-08-06 NOTE — TELEPHONE ENCOUNTER
Spoke with Mathew from [facility] pharmacy he wants to confirm the Humira as they both were ordered as the starters and he wants to also confirm will she continue the 80mg for maintenance or drop down to 40 mg please advise [phone]

## 2024-08-08 ENCOUNTER — TELEPHONE (OUTPATIENT)
Dept: DERMATOLOGY | Facility: CLINIC | Age: 49
End: 2024-08-08

## 2024-08-08 NOTE — TELEPHONE ENCOUNTER
CPT/DX Denied: CPT 79655 Denied do to DX Code L73.2 inconsistent with PX.    Date of service: 6/7/2024

## 2024-09-20 ENCOUNTER — TELEPHONE (OUTPATIENT)
Age: 49
End: 2024-09-20

## 2024-09-20 DIAGNOSIS — L73.2 HIDRADENITIS SUPPURATIVA: Primary | ICD-10-CM

## 2024-09-20 RX ORDER — ADALIMUMAB 80MG/0.8ML
80 KIT SUBCUTANEOUS
Qty: 2 EACH | Refills: 11 | Status: SHIPPED | OUTPATIENT
Start: 2024-09-20

## 2024-09-20 NOTE — TELEPHONE ENCOUNTER
Pharmacy called in stating that the   Adalimumab (Humira-CD/UC/HS Starter) 80 MG/0.8ML PNKT is a starter pack and they need a new prescription that does not say starter but states the same sig of Sig: Inject 80mg on week 6 then every other week there after for maintenance dose. Please advise

## 2024-09-23 NOTE — TELEPHONE ENCOUNTER
Former[facility] named [facility] pharmacy called again asking for a new script/advised of Dr Santizo's note  He said if they have any problems they will call back but for now it should be ok.

## 2024-12-17 ENCOUNTER — OFFICE VISIT (OUTPATIENT)
Dept: DERMATOLOGY | Facility: CLINIC | Age: 49
End: 2024-12-17
Payer: MEDICARE

## 2024-12-17 VITALS — TEMPERATURE: 98.1 F | WEIGHT: 198 LBS | BODY MASS INDEX: 29.33 KG/M2 | HEIGHT: 69 IN

## 2024-12-17 DIAGNOSIS — L85.3 XEROSIS OF SKIN: ICD-10-CM

## 2024-12-17 DIAGNOSIS — D18.01 CHERRY ANGIOMA: ICD-10-CM

## 2024-12-17 DIAGNOSIS — L81.4 LENTIGO: ICD-10-CM

## 2024-12-17 DIAGNOSIS — L73.2 HIDRADENITIS SUPPURATIVA: Primary | ICD-10-CM

## 2024-12-17 DIAGNOSIS — D22.9 MULTIPLE MELANOCYTIC NEVI: ICD-10-CM

## 2024-12-17 PROCEDURE — 99213 OFFICE O/P EST LOW 20 MIN: CPT | Performed by: DERMATOLOGY

## 2024-12-17 NOTE — PROGRESS NOTES
"[facility] Dermatology Clinic Note     Patient Name: Abigail Paulino  Encounter Date: 12/17/24     Have you been cared for by a [facility] Dermatologist in the last 3 years and, if so, which description applies to you?    Yes.  I have been here within the last 3 years, and my medical history has NOT changed since that time.  I am FEMALE/of child-bearing potential.    REVIEW OF SYSTEMS:  Have you recently had or currently have any of the following? No changes in my recent health.   PAST MEDICAL HISTORY:  Have you personally ever had or currently have any of the following?  If \"YES,\" then please provide more detail. No changes in my medical history.   HISTORY OF IMMUNOSUPPRESSION: Do you have a history of any of the following:  Systemic Immunosuppression such as Diabetes, Biologic or Immunotherapy, Chemotherapy, Organ Transplantation, Bone Marrow Transplantation or Prednisone?  YES, diabetes, currently on humira      Answering \"YES\" requires the addition of the dotphrase \"IMMUNOSUPPRESSED\" as the first diagnosis of the patient's visit.   FAMILY HISTORY:  Any \"first degree relatives\" (parent, brother, sister, or child) with the following?    No changes in my family's known health.   PATIENT EXPERIENCE:    Do you want the Dermatologist to perform a COMPLETE skin exam today including a clinical examination under the \"bra and underwear\" areas?  Yes  If necessary, do we have your permission to call and leave a detailed message on your Preferred Phone number that includes your specific medical information?  Yes      Allergies   Allergen Reactions    Erythromycin Abdominal Pain, Other (See Comments) and Vomiting     vomit      Vancomycin Abdominal Pain, GI Intolerance, Other (See Comments) and Vomiting     vomiting        Current Outpatient Medications:     Adalimumab (Humira, 2 Pen,) 80 MG/0.8ML PNKT, Inject 80 mg under the skin every 14 (fourteen) days Starting day 43, Disp: 2 each, Rfl: 11    Adalimumab (Humira-CD/UC/HS " Starter) 80 MG/0.8ML PNKT, Inject 160mg on week 0 for loading dose then 80mg on week 2 and week 4 for maintenance, Disp: 4 each, Rfl: 0    B Complex Vitamins (Vitamin B Complex) TABS, , Disp: , Rfl:     busPIRone (BUSPAR) 15 mg tablet, Take 15 mg by mouth 2 (two) times a day, Disp: , Rfl:     cetirizine (ZyrTEC) 10 mg tablet, Take 10 mg by mouth daily, Disp: , Rfl:     citalopram (CeleXA) 20 mg tablet, Take 20 mg by mouth daily, Disp: , Rfl:     Diclofenac Sodium (VOLTAREN) 1 %, , Disp: , Rfl:     famotidine (PEPCID) 40 MG tablet, Take 40 mg by mouth daily, Disp: , Rfl:     losartan (COZAAR) 100 MG tablet, Take 100 mg by mouth daily, Disp: , Rfl:     metFORMIN (GLUCOPHAGE-XR) 500 mg 24 hr tablet, take 2 tablets by mouth twice a day with meals, Disp: , Rfl:     methocarbamol (ROBAXIN) 500 mg tablet, Take 1 tablet (500 mg total) by mouth 4 (four) times a day, Disp: 30 tablet, Rfl: 0    NP Thyroid 15 MG tablet, take 1 tablet by mouth IN ADDITION  MGS FOR A TOTAL  MGS FOR 7 DAYS, Disp: , Rfl:     Omega-3 1000 MG CAPS, Take 1 g by mouth 2 (two) times a day, Disp: , Rfl:     Prevalite 4 g packet, take 1 packet by mouth twice a day, Disp: , Rfl:     pyridoxine (B-6) 100 MG tablet, Take 100 mg by mouth daily, Disp: , Rfl:     thyroid (ARMOUR) 120 MG tablet, Take 120 mg by mouth daily, Disp: , Rfl:           Whom besides the patient is providing clinical information about today's encounter?   NO ADDITIONAL HISTORIAN (patient alone provided history)    Physical Exam and Assessment/Plan by Diagnosis:    HIDRADENITIS SUPPURATIVA    Physical Exam:  Anatomic Location Affected:  axillas, chest, breast, back, inguinal folds   Morphological Description:  Boil-like nodules and abscesses, scars, tunneling, hurly stage 2    Pertinent Positives:  Pertinent Negatives:    Additional History of Present Condition:  Patient was last seen 6/5/24. Previously had Kenalog injections done, last one was on 6/15/23. De ryann procedure  has been discussed previously with patient. Washing with Hibiclens. Not taking oral Doxycycline any more and was previously on Humira but stopped due to insurance change but went back on medication as of June 2024. Pt believes Humira is not a affected this time around than before. Pt notes new flare ups on axilla, inguinal folds and underneath breast since going back on Humira. Currently on 80 mg every two weeks    Assessment and Plan:  Based on a thorough discussion of this condition and the management approach to it (including a comprehensive discussion of the known risks, side effects and potential benefits of treatment), the patient (family) agrees to implement the following specific plan:  Stop Humira, will switch to Cosentyx Hidradenitis suppurativa: SUBQ: 300 mg at weeks 0, 1, 2, 3, and 4 followed by 300 mg every 4 weeks   Will send a message to Prior Auth team to start Prior authorization for new medication   Monitor for any changes   Consider spironolactone at least 50 mg twice a day  Check with PCP   Follow up in 2 months in [facility] for Dr. Santizo's specialty clinic     What is hidradenitis suppurativa?  Hidradenitis suppurativa is an inflammatory skin disease that affects apocrine gland-bearing skin in the axillae, in the groin, and under the breasts. It is characterised by recurrent boil-like nodules and abscesses that culminate in pus-like discharge, difficult-to-heal open wounds (sinuses) and scarring. Hidradenitis suppurativa also has significant psychological impact and many patients suffer from impairment of body image, depression and anxiety.     The term hidradenitis implies it starts as an inflammatory disorder of sweat glands, which is now known to be incorrect. Hidradenitis suppurativa is also known as acne inversa.    Who gets hidradenitis suppurativa?  Hidradenitis often starts at puberty, and is most active between the ages of 20 and 40 years, and in women, can resolve at menopause.  It is three times more common in females than in males. Risk factors include:  Other family members with hidradenitis suppurativa  Obesity and insulin resistance/metabolic syndrome  Cigarette smoking  Follicular occlusion disorders: acne conglobata, dissecting cellulitis, pilonidal sinus  Inflammatory bowel disease (Crohn disease)  Rare autoinflammatory syndromes associated with abnormalities of PSTPIP1 gene.*    * PAPA syndrome (pyogenic arthritis, pyoderma gangrenosum and acne), PASH syndrome (pyoderma gangrenosum, acne, suppurative hidradenitis) and PAPASH syndrome (pyogenic arthritis, pyoderma gangrenosum, acne, suppurative hidradenitis).    What causes hidradenitis suppurativa?  Hidradenitis suppurativa is an autoinflammatory disorder. Although the exact cause is not yet understood, contributing factors include:  Friction from clothing and body folds  Aberrant immune response to commensal bacteria  Abnormal cutaneous or follicular microbiome  Follicular occlusion  Release of pro-inflammatory cytokines  Inflammation causing rupture of the follicular wall and destroying apocrine glands and ducts  Secondary bacterial infection  Certain drugs.    What are the clinical features of hidradenitis suppurativa?  Hidradenitis can affect a single or multiple areas in the armpits, neck, sub mammary area, and inner thighs. Anogenital involvement most commonly affects the groin, mons pubis, vulva (in females), sides of the scrotum (in males), perineum, buttocks and perianal folds.  Signs include:  Open and closed comedones  Painful firm papules, larger nodules and pleated ridges  Pustules, fluctuant pseudocysts and abscesses  Pyogenic granulomas  Draining sinuses linking inflammatory lesions  Hypertrophic and atrophic scars.    Many patients with hidradenitis suppurativa also suffer from other skin disorders, including acne, hirsutism and psoriasis.    The severity and extent of hidradenitis suppurativa is recorded at  assessment and when determining the impact of a treatment. The Gipson system describes three distinct clinical stages:  Solitary or multiple, isolated abscess formation without scarring or sinus tracts  Recurrent abscesses, single or multiple widely  lesions, with sinus tract formation  Diffuse or broad involvement, with multiple interconnected sinus tracts and abscesses.    Severe hidradenitis (Gipson Stage 3) has been associated with:  Male sex  Axillary and perianal involvement  Obesity  Smoking  Higher risk of stroke, coronary artery disease, heart failure, and peripheral artery disease  Disease duration.    What is the treatment for hidradenitis suppurativa?  General measures  Weight loss; follow an anti-inflammatory, low-sugar, low-grain, low-dairy diet (mainly plants)  Smoking cessation: this can lead to improvement within a few months  Loose fitting clothing  Daily unfragranced antiperspirants  If prone to secondary infection, wash with antiseptics or take bleach baths  Apply hydrogen peroxide solution or medical grade honey to reduce malodour  Use peeling agents such as resorcinol 15% cream to de-roof nodules  Apply simple dressings to draining sinuses  Analgesics, such as paracetamol (acetaminophen), for pain control  Seek help to manage anxiety and depression.    Medical management of hidradenitis suppurativa  Medical management of hidradenitis suppurativa is difficult. Treatment is required long term. Effective options are listed below.    Antibiotics  Topical clindamycin, with benzoyl peroxide to reduce bacterial resistance  Short course of oral antibiotics for acute staphylococcal abscesses, eg flucloxacillin  Prolonged courses (minimum 3 months) of tetracycline, metronidazole, trimethoprim + sulphamethoxazole, fluoroquinolones, ertapenem or dapsone for their anti-inflammatory action  6-12 week courses of the combination of clindamycin (or doxycycline) and rifampicin for severe  "disease.    Antiandrogens  Long-term oral contraceptive pill; antiandrogenic progesterones drospirenone or cyproterone acetate may be more effective than standard combined pills. These are more suitable than progesterone-only pills or devices.  Spironolactone and finasteride  Response takes 6 months or longer.    Immunomodulatory treatments for severe disease  Intralesional corticosteroids into nodules  Systemic corticosteroids short-term for flares  Methotrexate, ciclosporin, and azathioprine  TNF-? inhibitors adalimumab and infliximab, used in higher dose than required for psoriasis, are the most successful treatments to date. Note that paradoxically, they may sometimes induce new-onset hidradenitis suppurativa  Other biologics are under investigation, such as the IL-1? antagonist, canakinumab    Other medical treatments  Metformin in patients with insulin resistance  Acitretin (unsuitable for females of childbearing potential)  Isotretinoin -- effective for acne but appears unhelpful for most cases of hidradenitis  Colchicine  Medical management of anxiety and depression    Surgical management of hidradenitis suppurativa  Incision and drainage of acute abscesses  Curettage and deroofing of nodules, abscesses and sinuses  Laser ablation of nodules, abscesses and sinuses  Wide local excision of persistent nodules  Radical excisional surgery of entire affected area  Nd:YAG laser hair removal     MELANOCYTIC NEVI (\"Moles\")    Physical Exam:  Anatomic Location Affected:   Mostly on sun-exposed areas of the trunk and extremities  Morphological Description:  Scattered, 1-4mm round to ovoid, symmetrical-appearing, even bordered, skin colored to dark brown macules/papules, mostly in sun-exposed areas  Pertinent Positives:  Pertinent Negatives:    Additional History of Present Condition:      Assessment and Plan:  Based on a thorough discussion of this condition and the management approach to it (including a comprehensive " "discussion of the known risks, side effects and potential benefits of treatment), the patient (family) agrees to implement the following specific plan:  When outside we recommend using a wide brim hat, sunglasses, long sleeve and pants, sunscreen with SPF 30+ with reapplication every 2 hours, or SPF specific clothing   Benign, reassured  Annual skin check     Melanocytic Nevi  Melanocytic nevi (\"moles\") are tan or brown, raised or flat areas of the skin which have an increased number of melanocytes. Melanocytes are the cells in our body which make pigment and account for skin color.    Some moles are present at birth (I.e., \"congenital nevi\"), while others come up later in life (i.e., \"acquired nevi\").  The sun can stimulate the body to make more moles.  Sunburns are not the only thing that triggers more moles.  Chronic sun exposure can do it too.     Clinically distinguishing a healthy mole from melanoma may be difficult, even for experienced dermatologists. The \"ABCDE's\" of moles have been suggested as a means of helping to alert a person to a suspicious mole and the possible increased risk of melanoma.  The suggestions for raising alert are as follows:    Asymmetry: Healthy moles tend to be symmetric, while melanomas are often asymmetric.  Asymmetry means if you draw a line through the mole, the two halves do not match in color, size, shape, or surface texture. Asymmetry can be a result of rapid enlargement of a mole, the development of a raised area on a previously flat lesion, scaling, ulceration, bleeding or scabbing within the mole.  Any mole that starts to demonstrate \"asymmetry\" should be examined promptly by a board certified dermatologist.     Border: Healthy moles tend to have discrete, even borders.  The border of a melanoma often blends into the normal skin and does not sharply delineate the mole from normal skin.  Any mole that starts to demonstrate \"uneven borders\" should be examined promptly by a " "board certified dermatologist.     Color: Healthy moles tend to be one color throughout.  Melanomas tend to be made up of different colors ranging from dark black, blue, white, or red.  Any mole that demonstrates a color change should be examined promptly by a board certified dermatologist.     Diameter: Healthy moles tend to be smaller than 0.6 cm in size; an exception are \"congenital nevi\" that can be larger.  Melanomas tend to grow and can often be greater than 0.6 cm (1/4 of an inch, or the size of a pencil eraser). This is only a guideline, and many normal moles may be larger than 0.6 cm without being unhealthy.  Any mole that starts to change in size (small to bigger or bigger to smaller) should be examined promptly by a board certified dermatologist.     Evolving: Healthy moles tend to \"stay the same.\"  Melanomas may often show signs of change or evolution such as a change in size, shape, color, or elevation.  Any mole that starts to itch, bleed, crust, burn, hurt, or ulcerate or demonstrate a change or evolution should be examined promptly by a board certified dermatologist.        LENTIGO    Physical Exam:  Anatomic Location Affected:  trunk, arms  Morphological Description:  Light brown macules  Pertinent Positives:  Pertinent Negatives:    Additional History of Present Condition:      Assessment and Plan:  Based on a thorough discussion of this condition and the management approach to it (including a comprehensive discussion of the known risks, side effects and potential benefits of treatment), the patient (family) agrees to implement the following specific plan:  When outside we recommend using a wide brim hat, sunglasses, long sleeve and pants, sunscreen with SPF 30+ with reapplication every 2 hours, or SPF specific clothing       What is a lentigo?  A lentigo is a pigmented flat or slightly raised lesion with a clearly defined edge. Unlike an ephelis (freckle), it does not fade in the winter months. " There are several kinds of lentigo.  The name lentigo originally referred to its appearance resembling a small lentil. The plural of lentigo is lentigines, although “lentigos” is also in common use.    Who gets lentigines?  Lentigines can affect males and females of all ages and races. Solar lentigines are especially prevalent in fair skinned adults. Lentigines associated with syndromes are present at birth or arise during childhood.    What causes lentigines?  Common forms of lentigo are due to exposure to ultraviolet radiation:  Sun damage including sunburn   Indoor tanning   Phototherapy, especially photochemotherapy (PUVA)    Ionizing radiation, eg radiation therapy, can also cause lentigines.  Several familial syndromes associated with widespread lentigines originate from mutations in Raul-MAP kinase, mTOR signaling and PTEN pathways.    What is the treatment for lentigines?  Most lentigines are left alone. Attempts to lighten them may not be successful. The following approaches are used:  SPF 50+ broad-spectrum sunscreen   Hydroquinone bleaching cream   Alpha hydroxy acids   Vitamin C   Retinoids   Azelaic acid   Chemical peels  Individual lesions can be permanently removed using:  Cryotherapy   Intense pulsed light   Pigment lasers    How can lentigines be prevented?  Lentigines associated with exposure ultraviolet radiation can be prevented by very careful sun protection. Clothing is more successful at preventing new lentigines than are sunscreens.    What is the outlook for lentigines?  Lentigines usually persist. They may increase in number with age and sun exposure. Some in sun-protected sites may fade and disappear.    JACKSON ANGIOMAS    Physical Exam:  Anatomic Location Affected:  trunk  Morphological Description:  Scattered cherry red, 1-4 mm papules.  Pertinent Positives:  Pertinent Negatives:    Additional History of Present Condition:      Assessment and Plan:  Based on a thorough discussion of this  "condition and the management approach to it (including a comprehensive discussion of the known risks, side effects and potential benefits of treatment), the patient (family) agrees to implement the following specific plan:  Monitor for changes  Benign, reassured      Assessment and Plan:    Cherry angioma, also known as Trivedi de Darek spots, are benign vascular skin lesions. A \"cherry angioma\" is a firm red, blue or purple papule, 0.1-1 cm in diameter. When thrombosed, they can appear black in colour until evaluated with a dermatoscope when the red or purple colour is more easily seen. Cherry angioma may develop on any part of the body but most often appear on the scalp, face, lips and trunk.  An angioma is due to proliferating endothelial cells; these are the cells that line the inside of a blood vessel.    Angiomas can arise in early life or later in life; the most common type of angioma is a cherry angioma.  Cherry angiomas are very common in males and females of any age or race. They are more noticeable in white skin than in skin of colour. They markedly increase in number from about the age of 40. There may be a family history of similar lesions. Eruptive cherry angiomas have been rarely reported to be associated with internal malignancy. The cause of angiomas is unknown. Genetic analysis of cherry angiomas has shown that they frequently carry specific somatic missense mutations in the GNAQ and GNA11 (Q209H) genes, which are involved in other vascular and melanocytic proliferations.      XEROSIS (\"DRY SKIN\")    Physical Exam:  Anatomic Location Affected:  diffuse  Morphological Description:  xerosis  Pertinent Positives:  Pertinent Negatives:    Additional History of Present Condition:      Assessment and Plan:  Based on a thorough discussion of this condition and the management approach to it (including a comprehensive discussion of the known risks, side effects and potential benefits of treatment), the " patient (family) agrees to implement the following specific plan:  Use moisturizer like Eucerin,Cerave or Aveeno Cream 3 times a day for the dry skin            Dry skin refers to skin that feels dry to touch. Dry skin has a dull surface with a rough, scaly quality. The skin is less pliable and cracked. When dryness is severe, the skin may become inflamed and fissured.  Although any body site can be dry, dry skin tends to affect the shins more than any other site.    Dry skin is lacking moisture in the outer horny cell layer (stratum corneum) and this results in cracks in the skin surface.  Dry skin is also called xerosis, xeroderma or asteatosis (lack of fat).  It can affect males and females of all ages. There is some racial variability in water and lipid content of the skin.  Dry skin that starts in early childhood may be one of about 20 types of ichthyosis (fish-scale skin). There is often a family history of dry skin.   Dry skin is commonly seen in people with atopic dermatitis.  Nearly everyone > 60 years has dry skin.    Dry skin that begins later may be seen in people with certain diseases and conditions.  Postmenopausal women  Hypothyroidism  Chronic renal disease   Malnutrition and weight loss   Subclinical dermatitis   Treatment with certain drugs such as oral retinoids, diuretics and epidermal growth factor receptor inhibitors      What is the treatment for dry skin?  The mainstay of treatment of dry skin and ichthyosis is moisturisers/emollients. They should be applied liberally and often enough to:  Reduce itch   Improve the barrier function   Prevent entry of irritants, bacteria   Reduce transepidermal water loss.      How can dry skin be prevented?  Eliminate aggravating factors:  Reduce the frequency of bathing.   A humidifier in winter and air conditioner in summer   Compare having a short shower with a prolonged soak in a bath.   Use lukewarm, not hot, water.   Replace standard soap with a  substitute such as a synthetic detergent cleanser, water-miscible emollient, bath oil, anti-pruritic tar oil, colloidal oatmeal etc.   Apply an emollient liberally and often, particularly shortly after bathing, and when itchy. The drier the skin, the thicker this should be, especially on the hands.    What is the outlook for dry skin?  A tendency to dry skin may persist life-long, or it may improve once contributing factors are controlled.     Scribe Attestation      I,:  Evelin Silva MA am acting as a scribe while in the presence of the attending physician.:       I,:  Brandie Santizo MD personally performed the services described in this documentation    as scribed in my presence.:

## 2024-12-17 NOTE — PATIENT INSTRUCTIONS
HIDRADENITIS SUPPURATIVA    Assessment and Plan:  Based on a thorough discussion of this condition and the management approach to it (including a comprehensive discussion of the known risks, side effects and potential benefits of treatment), the patient (family) agrees to implement the following specific plan:  Stop Humira, will switch to Cosentyx   Will send a message to Prior Auth team to start Prior authorization for new medication   Monitor for any changes   Consider spironolactone at least 50 mg twice a day  Check with PCP   Follow up in 2 months in [facility] for Dr. Santizo's specialty clinic     What is hidradenitis suppurativa?  Hidradenitis suppurativa is an inflammatory skin disease that affects apocrine gland-bearing skin in the axillae, in the groin, and under the breasts. It is characterised by recurrent boil-like nodules and abscesses that culminate in pus-like discharge, difficult-to-heal open wounds (sinuses) and scarring. Hidradenitis suppurativa also has significant psychological impact and many patients suffer from impairment of body image, depression and anxiety.     The term hidradenitis implies it starts as an inflammatory disorder of sweat glands, which is now known to be incorrect. Hidradenitis suppurativa is also known as acne inversa.    Who gets hidradenitis suppurativa?  Hidradenitis often starts at puberty, and is most active between the ages of 20 and 40 years, and in women, can resolve at menopause. It is three times more common in females than in males. Risk factors include:  Other family members with hidradenitis suppurativa  Obesity and insulin resistance/metabolic syndrome  Cigarette smoking  Follicular occlusion disorders: acne conglobata, dissecting cellulitis, pilonidal sinus  Inflammatory bowel disease (Crohn disease)  Rare autoinflammatory syndromes associated with abnormalities of PSTPIP1 gene.*    * PAPA syndrome (pyogenic arthritis, pyoderma gangrenosum and acne), PASH  syndrome (pyoderma gangrenosum, acne, suppurative hidradenitis) and PAPASH syndrome (pyogenic arthritis, pyoderma gangrenosum, acne, suppurative hidradenitis).    What causes hidradenitis suppurativa?  Hidradenitis suppurativa is an autoinflammatory disorder. Although the exact cause is not yet understood, contributing factors include:  Friction from clothing and body folds  Aberrant immune response to commensal bacteria  Abnormal cutaneous or follicular microbiome  Follicular occlusion  Release of pro-inflammatory cytokines  Inflammation causing rupture of the follicular wall and destroying apocrine glands and ducts  Secondary bacterial infection  Certain drugs.    What are the clinical features of hidradenitis suppurativa?  Hidradenitis can affect a single or multiple areas in the armpits, neck, sub mammary area, and inner thighs. Anogenital involvement most commonly affects the groin, mons pubis, vulva (in females), sides of the scrotum (in males), perineum, buttocks and perianal folds.  Signs include:  Open and closed comedones  Painful firm papules, larger nodules and pleated ridges  Pustules, fluctuant pseudocysts and abscesses  Pyogenic granulomas  Draining sinuses linking inflammatory lesions  Hypertrophic and atrophic scars.    Many patients with hidradenitis suppurativa also suffer from other skin disorders, including acne, hirsutism and psoriasis.    The severity and extent of hidradenitis suppurativa is recorded at assessment and when determining the impact of a treatment. The Gipson system describes three distinct clinical stages:  Solitary or multiple, isolated abscess formation without scarring or sinus tracts  Recurrent abscesses, single or multiple widely  lesions, with sinus tract formation  Diffuse or broad involvement, with multiple interconnected sinus tracts and abscesses.    Severe hidradenitis (Gipson Stage 3) has been associated with:  Male sex  Axillary and perianal  involvement  Obesity  Smoking  Higher risk of stroke, coronary artery disease, heart failure, and peripheral artery disease  Disease duration.    What is the treatment for hidradenitis suppurativa?  General measures  Weight loss; follow an anti-inflammatory, low-sugar, low-grain, low-dairy diet (mainly plants)  Smoking cessation: this can lead to improvement within a few months  Loose fitting clothing  Daily unfragranced antiperspirants  If prone to secondary infection, wash with antiseptics or take bleach baths  Apply hydrogen peroxide solution or medical grade honey to reduce malodour  Use peeling agents such as resorcinol 15% cream to de-roof nodules  Apply simple dressings to draining sinuses  Analgesics, such as paracetamol (acetaminophen), for pain control  Seek help to manage anxiety and depression.    Medical management of hidradenitis suppurativa  Medical management of hidradenitis suppurativa is difficult. Treatment is required long term. Effective options are listed below.    Antibiotics  Topical clindamycin, with benzoyl peroxide to reduce bacterial resistance  Short course of oral antibiotics for acute staphylococcal abscesses, eg flucloxacillin  Prolonged courses (minimum 3 months) of tetracycline, metronidazole, trimethoprim + sulphamethoxazole, fluoroquinolones, ertapenem or dapsone for their anti-inflammatory action  6-12 week courses of the combination of clindamycin (or doxycycline) and rifampicin for severe disease.    Antiandrogens  Long-term oral contraceptive pill; antiandrogenic progesterones drospirenone or cyproterone acetate may be more effective than standard combined pills. These are more suitable than progesterone-only pills or devices.  Spironolactone and finasteride  Response takes 6 months or longer.    Immunomodulatory treatments for severe disease  Intralesional corticosteroids into nodules  Systemic corticosteroids short-term for flares  Methotrexate, ciclosporin, and  "azathioprine  TNF-? inhibitors adalimumab and infliximab, used in higher dose than required for psoriasis, are the most successful treatments to date. Note that paradoxically, they may sometimes induce new-onset hidradenitis suppurativa  Other biologics are under investigation, such as the IL-1? antagonist, canakinumab    Other medical treatments  Metformin in patients with insulin resistance  Acitretin (unsuitable for females of childbearing potential)  Isotretinoin -- effective for acne but appears unhelpful for most cases of hidradenitis  Colchicine  Medical management of anxiety and depression    Surgical management of hidradenitis suppurativa  Incision and drainage of acute abscesses  Curettage and deroofing of nodules, abscesses and sinuses  Laser ablation of nodules, abscesses and sinuses  Wide local excision of persistent nodules  Radical excisional surgery of entire affected area  Nd:YAG laser hair removal     MELANOCYTIC NEVI (\"Moles\")    Assessment and Plan:  Based on a thorough discussion of this condition and the management approach to it (including a comprehensive discussion of the known risks, side effects and potential benefits of treatment), the patient (family) agrees to implement the following specific plan:  When outside we recommend using a wide brim hat, sunglasses, long sleeve and pants, sunscreen with SPF 30+ with reapplication every 2 hours, or SPF specific clothing   Benign, reassured  Annual skin check     Melanocytic Nevi  Melanocytic nevi (\"moles\") are tan or brown, raised or flat areas of the skin which have an increased number of melanocytes. Melanocytes are the cells in our body which make pigment and account for skin color.    Some moles are present at birth (I.e., \"congenital nevi\"), while others come up later in life (i.e., \"acquired nevi\").  The sun can stimulate the body to make more moles.  Sunburns are not the only thing that triggers more moles.  Chronic sun exposure can do it " "too.     Clinically distinguishing a healthy mole from melanoma may be difficult, even for experienced dermatologists. The \"ABCDE's\" of moles have been suggested as a means of helping to alert a person to a suspicious mole and the possible increased risk of melanoma.  The suggestions for raising alert are as follows:    Asymmetry: Healthy moles tend to be symmetric, while melanomas are often asymmetric.  Asymmetry means if you draw a line through the mole, the two halves do not match in color, size, shape, or surface texture. Asymmetry can be a result of rapid enlargement of a mole, the development of a raised area on a previously flat lesion, scaling, ulceration, bleeding or scabbing within the mole.  Any mole that starts to demonstrate \"asymmetry\" should be examined promptly by a board certified dermatologist.     Border: Healthy moles tend to have discrete, even borders.  The border of a melanoma often blends into the normal skin and does not sharply delineate the mole from normal skin.  Any mole that starts to demonstrate \"uneven borders\" should be examined promptly by a board certified dermatologist.     Color: Healthy moles tend to be one color throughout.  Melanomas tend to be made up of different colors ranging from dark black, blue, white, or red.  Any mole that demonstrates a color change should be examined promptly by a board certified dermatologist.     Diameter: Healthy moles tend to be smaller than 0.6 cm in size; an exception are \"congenital nevi\" that can be larger.  Melanomas tend to grow and can often be greater than 0.6 cm (1/4 of an inch, or the size of a pencil eraser). This is only a guideline, and many normal moles may be larger than 0.6 cm without being unhealthy.  Any mole that starts to change in size (small to bigger or bigger to smaller) should be examined promptly by a board certified dermatologist.     Evolving: Healthy moles tend to \"stay the same.\"  Melanomas may often show signs of " change or evolution such as a change in size, shape, color, or elevation.  Any mole that starts to itch, bleed, crust, burn, hurt, or ulcerate or demonstrate a change or evolution should be examined promptly by a board certified dermatologist.        LENTIGO    Assessment and Plan:  Based on a thorough discussion of this condition and the management approach to it (including a comprehensive discussion of the known risks, side effects and potential benefits of treatment), the patient (family) agrees to implement the following specific plan:  When outside we recommend using a wide brim hat, sunglasses, long sleeve and pants, sunscreen with SPF 30+ with reapplication every 2 hours, or SPF specific clothing       What is a lentigo?  A lentigo is a pigmented flat or slightly raised lesion with a clearly defined edge. Unlike an ephelis (freckle), it does not fade in the winter months. There are several kinds of lentigo.  The name lentigo originally referred to its appearance resembling a small lentil. The plural of lentigo is lentigines, although “lentigos” is also in common use.    Who gets lentigines?  Lentigines can affect males and females of all ages and races. Solar lentigines are especially prevalent in fair skinned adults. Lentigines associated with syndromes are present at birth or arise during childhood.    What causes lentigines?  Common forms of lentigo are due to exposure to ultraviolet radiation:  Sun damage including sunburn   Indoor tanning   Phototherapy, especially photochemotherapy (PUVA)    Ionizing radiation, eg radiation therapy, can also cause lentigines.  Several familial syndromes associated with widespread lentigines originate from mutations in Raul-MAP kinase, mTOR signaling and PTEN pathways.    What is the treatment for lentigines?  Most lentigines are left alone. Attempts to lighten them may not be successful. The following approaches are used:  SPF 50+ broad-spectrum sunscreen   Hydroquinone  "bleaching cream   Alpha hydroxy acids   Vitamin C   Retinoids   Azelaic acid   Chemical peels  Individual lesions can be permanently removed using:  Cryotherapy   Intense pulsed light   Pigment lasers    How can lentigines be prevented?  Lentigines associated with exposure ultraviolet radiation can be prevented by very careful sun protection. Clothing is more successful at preventing new lentigines than are sunscreens.    What is the outlook for lentigines?  Lentigines usually persist. They may increase in number with age and sun exposure. Some in sun-protected sites may fade and disappear.    JACKSON ANGIOMAS      Assessment and Plan:  Based on a thorough discussion of this condition and the management approach to it (including a comprehensive discussion of the known risks, side effects and potential benefits of treatment), the patient (family) agrees to implement the following specific plan:  Monitor for changes  Benign, reassured      Assessment and Plan:    Cherry angioma, also known as Trivedi de Darek spots, are benign vascular skin lesions. A \"cherry angioma\" is a firm red, blue or purple papule, 0.1-1 cm in diameter. When thrombosed, they can appear black in colour until evaluated with a dermatoscope when the red or purple colour is more easily seen. Cherry angioma may develop on any part of the body but most often appear on the scalp, face, lips and trunk.  An angioma is due to proliferating endothelial cells; these are the cells that line the inside of a blood vessel.    Angiomas can arise in early life or later in life; the most common type of angioma is a cherry angioma.  Cherry angiomas are very common in males and females of any age or race. They are more noticeable in white skin than in skin of colour. They markedly increase in number from about the age of 40. There may be a family history of similar lesions. Eruptive cherry angiomas have been rarely reported to be associated with internal " "malignancy. The cause of angiomas is unknown. Genetic analysis of cherry angiomas has shown that they frequently carry specific somatic missense mutations in the GNAQ and GNA11 (Q209H) genes, which are involved in other vascular and melanocytic proliferations.      XEROSIS (\"DRY SKIN\")    Assessment and Plan:  Based on a thorough discussion of this condition and the management approach to it (including a comprehensive discussion of the known risks, side effects and potential benefits of treatment), the patient (family) agrees to implement the following specific plan:  Use moisturizer like Eucerin,Cerave or Aveeno Cream 3 times a day for the dry skin            Dry skin refers to skin that feels dry to touch. Dry skin has a dull surface with a rough, scaly quality. The skin is less pliable and cracked. When dryness is severe, the skin may become inflamed and fissured.  Although any body site can be dry, dry skin tends to affect the shins more than any other site.    Dry skin is lacking moisture in the outer horny cell layer (stratum corneum) and this results in cracks in the skin surface.  Dry skin is also called xerosis, xeroderma or asteatosis (lack of fat).  It can affect males and females of all ages. There is some racial variability in water and lipid content of the skin.  Dry skin that starts in early childhood may be one of about 20 types of ichthyosis (fish-scale skin). There is often a family history of dry skin.   Dry skin is commonly seen in people with atopic dermatitis.  Nearly everyone > 60 years has dry skin.    Dry skin that begins later may be seen in people with certain diseases and conditions.  Postmenopausal women  Hypothyroidism  Chronic renal disease   Malnutrition and weight loss   Subclinical dermatitis   Treatment with certain drugs such as oral retinoids, diuretics and epidermal growth factor receptor inhibitors      What is the treatment for dry skin?  The mainstay of treatment of dry skin " and ichthyosis is moisturisers/emollients. They should be applied liberally and often enough to:  Reduce itch   Improve the barrier function   Prevent entry of irritants, bacteria   Reduce transepidermal water loss.      How can dry skin be prevented?  Eliminate aggravating factors:  Reduce the frequency of bathing.   A humidifier in winter and air conditioner in summer   Compare having a short shower with a prolonged soak in a bath.   Use lukewarm, not hot, water.   Replace standard soap with a substitute such as a synthetic detergent cleanser, water-miscible emollient, bath oil, anti-pruritic tar oil, colloidal oatmeal etc.   Apply an emollient liberally and often, particularly shortly after bathing, and when itchy. The drier the skin, the thicker this should be, especially on the hands.    What is the outlook for dry skin?  A tendency to dry skin may persist life-long, or it may improve once contributing factors are controlled.

## 2024-12-18 ENCOUNTER — TELEPHONE (OUTPATIENT)
Dept: DERMATOLOGY | Facility: CLINIC | Age: 49
End: 2024-12-18

## 2024-12-18 DIAGNOSIS — L73.2 HIDRADENITIS SUPPURATIVA: Primary | ICD-10-CM

## 2024-12-18 NOTE — TELEPHONE ENCOUNTER
New pa cosentyx for HS, failed humira    Hidradenitis suppurativa: SUBQ: 300 mg at weeks 0, 1, 2, 3, and 4 followed by 300 mg every 4 weeks

## 2024-12-20 NOTE — TELEPHONE ENCOUNTER
PA for Cosentyx 300mg [employer] Auto injector SUBMITTED to [employer]    via    []CMM-KEY:   []Surescripts-Case ID #   []Availity-Auth ID # NDC #   [x]Faxed to plan   []Other website   []Phone call Case ID #     [x]PA sent as URGENT    All office notes, labs and other pertaining documents and studies sent. Clinical questions answered. Awaiting determination from insurance company.     Turnaround time for your insurance to make a decision on your Prior Authorization can take 7-21 business days.

## 2024-12-23 NOTE — TELEPHONE ENCOUNTER
PA for Cosentyx 300mg [employer]oReady Auto Injector  APPROVED     Date(s) approved 12/20/2024 - 12/20/2025      Patient advised by          []MyChart Message  []Phone call   [x]LMOM  []L/M to call office as no active Communication consent on file  []Unable to leave detailed message as VM not approved on Communication consent       Pharmacy advised by    [x]Fax  []Phone call    Approval letter scanned into [employer] Yes

## 2024-12-24 RX ORDER — SECUKINUMAB 300 MG/2ML
INJECTION SUBCUTANEOUS
Qty: 10 ML | Refills: 0 | Status: SHIPPED | OUTPATIENT
Start: 2024-12-24

## 2024-12-24 RX ORDER — SECUKINUMAB 300 MG/2ML
INJECTION SUBCUTANEOUS
Qty: 2 ML | Refills: 11 | Status: SHIPPED | OUTPATIENT
Start: 2024-12-24

## 2025-02-28 ENCOUNTER — OFFICE VISIT (OUTPATIENT)
Dept: DERMATOLOGY | Facility: CLINIC | Age: 50
End: 2025-02-28
Payer: MEDICARE

## 2025-02-28 VITALS — TEMPERATURE: 97.8 F

## 2025-02-28 DIAGNOSIS — L71.9 ROSACEA: ICD-10-CM

## 2025-02-28 DIAGNOSIS — L73.2 HIDRADENITIS SUPPURATIVA: Primary | ICD-10-CM

## 2025-02-28 PROCEDURE — 99214 OFFICE O/P EST MOD 30 MIN: CPT | Performed by: DERMATOLOGY

## 2025-02-28 RX ORDER — DOXYCYCLINE 100 MG/1
100 CAPSULE ORAL 2 TIMES DAILY
Qty: 60 CAPSULE | Refills: 0 | Status: SHIPPED | OUTPATIENT
Start: 2025-02-28 | End: 2025-03-30

## 2025-02-28 NOTE — PROGRESS NOTES
"[facility] Dermatology Clinic Note     Patient Name: Abigail Paulino  Encounter Date: 2/28/25     Have you been cared for by a [facility] Dermatologist in the last 3 years and, if so, which description applies to you?    Yes.  I have been here within the last 3 years, and my medical history has NOT changed since that time.  I am FEMALE/of child-bearing potential.    REVIEW OF SYSTEMS:  Have you recently had or currently have any of the following? No changes in my recent health.   PAST MEDICAL HISTORY:  Have you personally ever had or currently have any of the following?  If \"YES,\" then please provide more detail. No changes in my medical history.   HISTORY OF IMMUNOSUPPRESSION: Do you have a history of any of the following:  Systemic Immunosuppression such as Diabetes, Biologic or Immunotherapy, Chemotherapy, Organ Transplantation, Bone Marrow Transplantation or Prednisone?  YES, diabetes     Answering \"YES\" requires the addition of the dotphrase \"IMMUNOSUPPRESSED\" as the first diagnosis of the patient's visit.   FAMILY HISTORY:  Any \"first degree relatives\" (parent, brother, sister, or child) with the following?    No changes in my family's known health.   PATIENT EXPERIENCE:    Do you want the Dermatologist to perform a COMPLETE skin exam today including a clinical examination under the \"bra and underwear\" areas?  NO  If necessary, do we have your permission to call and leave a detailed message on your Preferred Phone number that includes your specific medical information?  Yes      Allergies   Allergen Reactions    Erythromycin Abdominal Pain, Other (See Comments) and Vomiting     vomit      Vancomycin Abdominal Pain, GI Intolerance, Other (See Comments) and Vomiting     vomiting        Current Outpatient Medications:     busPIRone (BUSPAR) 15 mg tablet, Take 15 mg by mouth 2 (two) times a day, Disp: , Rfl:     cetirizine (ZyrTEC) 10 mg tablet, Take 10 mg by mouth daily, Disp: , Rfl:     Diclofenac Sodium " (VOLTAREN) 1 %, , Disp: , Rfl:     losartan (COZAAR) 100 MG tablet, Take 100 mg by mouth daily, Disp: , Rfl:     methocarbamol (ROBAXIN) 500 mg tablet, Take 1 tablet (500 mg total) by mouth 4 (four) times a day, Disp: 30 tablet, Rfl: 0    Secukinumab (Cosentyx UnoReady) 300 MG/2ML SOAJ, Inject 300mg on week 8 then every 4 weeks there after for maintenance dosing., Disp: 2 mL, Rfl: 11    Secukinumab (Cosentyx UnoReady) 300 MG/2ML SOAJ, Inject 300mg on week 0, 1, 2, 3, and 4 for loading dose., Disp: 10 mL, Rfl: 0    B Complex Vitamins (Vitamin B Complex) TABS, , Disp: , Rfl:     citalopram (CeleXA) 20 mg tablet, Take 20 mg by mouth daily, Disp: , Rfl:     famotidine (PEPCID) 40 MG tablet, Take 40 mg by mouth daily, Disp: , Rfl:     metFORMIN (GLUCOPHAGE-XR) 500 mg 24 hr tablet, take 2 tablets by mouth twice a day with meals, Disp: , Rfl:     NP Thyroid 15 MG tablet, take 1 tablet by mouth IN ADDITION  MGS FOR A TOTAL  MGS FOR 7 DAYS, Disp: , Rfl:     Omega-3 1000 MG CAPS, Take 1 g by mouth 2 (two) times a day, Disp: , Rfl:     Prevalite 4 g packet, take 1 packet by mouth twice a day, Disp: , Rfl:     pyridoxine (B-6) 100 MG tablet, Take 100 mg by mouth daily, Disp: , Rfl:     thyroid (ARMOUR) 120 MG tablet, Take 120 mg by mouth daily, Disp: , Rfl:           Whom besides the patient is providing clinical information about today's encounter?   NO ADDITIONAL HISTORIAN (patient alone provided history)    Physical Exam and Assessment/Plan by Diagnosis:      HIDRADENITIS SUPPURATIVA    Physical Exam:  Anatomic Location: underarms (axilla)  between breasts (intra-mammary)  groin area (inguinal)  buttock/gluteal cleft  Morphologic Description:  Boil-like nodules and abscesses  Gipson Stage: Stage II (MODERATE):  Recurrent abscesses and nodules WITH sinus tract formation or scarring: single or multiple widely  lesions.  Skin scar contracture or restricted range of motion present?  YES  Pertinent  "Positives:  Pertinent Negatives:    Additional History of Present Condition:  Patient was seen 12/17/24 and was switched to cosentyx. Patient is currently on 300 mg monthly with no flares. Still using hibiclens. At last visit, patient was to discuss with PCP about taking spironolactone but patient did not remember the name so could not.   History of diabetes? YES  Current smoker? YES hx of cigarettes  Change in range of motion?  YES    Plan:  Hidradenitis suppurative is an autoimmune condition. It is not infectious in nature and is not the result of poor hygiene.  Discussed the chronic nature of the condition and the potential for flare-ups and recurrence. Chronic flare-ups may result in scarring of the skin. Flare-ups may worsen around the time of menstruation, and hormonal therapy may provide benefit. The risk of flare-ups can be lowered by maintaining a healthy weight, not smoking, staying out of hot and humid climates, not wearing tight clothing, and decreasing stress.  Hidradenitis suppurativa may be associated with feelings of negativity. Seek help if symptoms of depression or anxiety develop.  Biologic Therapy: Continue Cosentyx 300 mg every 4 weeks   Follow up in 6 months  Patient to ask PCP about spironolactone      PROCEDURES PERFORMED TODAY ASSOCIATED WITH THIS CONDITION:          NONE     Medical Complexity:    CHRONIC ILLNESS (expected duration of >1 year):  STABLE (i.e., AT TREATMENT GOAL). \"Stable\" refers to treatment goals for the individual patient.  A patient not at treatment goal is NOT stable even if the condition is not changing and the patient is asymptomatic because the risk of morbidity without treatment is significant.      RASH; DDX: ROSACEA vs Drug related reaction     Physical Exam:  Anatomic Location Affected:  bilateral cheeks  Morphological Description:  erythematous papules  Pertinent Positives:  Pertinent Negatives:    Additional History of Present Condition:  They are not itchy or " painful. Patient reports that they appeared around the same time she started cosentyx. Patient reports flushing of face when drinking alcohol. Patient does not eat spicy food. Patient has never had anything like this before. Patient also reports new blush use at the time, but patient stopped once rash started. It did not alter her rash.     Assessment and Plan:  Based on a thorough discussion of this condition and the management approach to it (including a comprehensive discussion of the known risks, side effects and potential benefits of treatment), the patient (family) agrees to implement the following specific plan:  Doxycycline 100 mg twice a day for one month.   Discussed triple cream use today, patient would like to try doxycycline first.   Follow up in 6 months.     DOXYCYCLINE     This medication is usually taken ONCE or TWICE per day, as instructed by your physician.   NOTE: Always take this medication with lots of water! A pill stuck in the throat can cause significant burning and irritation. Avoid “popping” a pill right before bed & stay upright for at least one hour after taking a pill.     WARNING:   Doxycycline increases your sensitivity to the sun, so practice excellent sun protection! Make sure you protect yourself from the sun, either by avoiding being outside between 11 AM and 3 PM, wearing and reapplying sunscreen/sunblock, or wearing sun protective clothing. If you notice any of the following, stop using the medication and notify your health care provider: headaches; blurred vision; dizziness; sun sensitivity; heartburn-stomach pain; irritation of the esophagus; darkening of scars, gums, or teeth (more often with minocycline); nail changes; yellowing of the eyes or skin (indicating possible liver disease); joint pains-and flu-like symptoms. Taking oral antibiotics with food may help with symptoms of upset stomach.     COMMON SIDE EFFECTS:   Headaches; dizziness; sun sensitivity; irritation of the  throat; discoloration of scars, gums, or teeth; nail changes.    When using this medication for acne treatment, a product containing benzoyl peroxide should be used along with these antibiotics to help decrease the possibility of microbial resistance.    WHEN AND WHERE TO CALL WITH CONCERNS  We are here to help!  If you experience any unusual symptoms, then stop taking or using the medication and call our office at [phone] (SKIN).  It is better to be safe than to be sorry!      Rosacea is a chronic rash affecting the mid-face including the nose, cheeks, chin, forehead, and eyelids. The incidence is usually greatest between the ages of 30-60 years and is more common in people with fair skin. Common characteristics include redness, telangiectasias, papules and pustules over affected areas. Rosacea may look similar to acne, but there is a lack of comedones. Occasionally the eyes may also be involved in ocular rosacea. In advanced disease, enlargement of the sebaceous glands in the nose, termed rhinophyma, may be present.     Rosacea results in red spots (papules) and sometimes pustules over the face, but unlike acne there are no blackheads, whiteheads, or cystic nodules. Patients often experience increased facial flushing with prominent blood vessels (erythematotelangiectatic rosacea) and dry, sensitive skin. These symptoms are exacerbated by sun exposure, hot or spicy foods, topical steroids and oil-based facial products.     In ocular rosacea, eyelids may be red and sore due to conjunctivitis, keratitis, and episcleritis. If rhinophyma develops due to enlargement of sebaceous glands, the patient may have an enlarged and irregularly shaped nose with prominent pores. In rosacea that is refractory to treatment, patients can develop persistent redness and swelling of the face due to lymphatic obstruction (Morbihan disease).     Distribution around the cheeks may be confused with the malar or “butterfly rash” of  lupus. However, the rash of lupus spares the nasal creases and lacks papules and pustules. If signs of photosensitivity, oral ulcers, arthritis, and kidney dysfunction are present then consider referral to a rheumatologist.     There are many potential causes of rosacea including genetic, environmental, vascular, and inflammatory factors. These include, but are not limited to:  Chronic exposure to ultraviolet radiation   Increased immune responses in the form of cathelicidins that promote vessel dilation and infiltration with white blood cells (neutrophils) into the dermis  Increased matrix metalloproteinases such as collagen and elastase that remodel normal tissue may contribute to inflammation of the skin making it thicker and harder  There is some evidence to suggest that increased numbers of demodex mites on patient skin may contribute to rosacea papules     General Treatment Approach   Avoid exacerbating factors such as heat, spicy foods, and alcohol   Use daily SPF30+ sunscreen and other methods of coverage for sun protection  Use water-based make-up   Avoid applying topical steroids to affected areas as they can cause perioral dermatitis and exacerbate rosacea     Topical Treatment Approach  Metronidazole cream or gel by itself or in combination with oral antibiotics for more severe cases  Azelaic acid cream or lotion is effective for mild inflammatory rosacea when applied twice daily to affected areas  Brimonidine gel and oxymetazoline hydrochloride cream can reduce facial redness temporarily   Ivermectin cream can treat papulopustular rosacea by controlling demodex mites and inflammation   Pimecrolimus cream or tacrolimus ointment twice a day for 2-3 months can help reduce inflammation    Oral Treatment Approach  Antibiotics such as doxycycline, minocycline, or erythromycin for 1-3 months  Clonidine and carvedilol can help reduce facial flushing and are generally well tolerated. Common side effects include  low blood pressure, gastrointestinal upset, dry eyes, blurred vision and low heart rate.   Isotretinoin at low doses can be effective for long term treatment when antibiotics fail. Side effects may make it unsuitable for some patients.   NSAIDs such as diclofenac can help reduce discomfort and redness in the skin.     Procedural/Surgical Treatment Approach   Vascular lasers or intense pulsed light treatment may be used to treat persistent telangiectasia and papulopustular rosacea  Plastic surgery and carbon dioxide lasers may be used to treat rhinophyma        Ming Salgado MD  PGY-II Dermatology Resident         Scribe Attestation      I,:  Octavio Ruiz am acting as a scribe while in the presence of the attending physician.:       I,:  Brandie Santizo MD personally performed the services described in this documentation    as scribed in my presence.:

## 2025-02-28 NOTE — PATIENT INSTRUCTIONS
HIDRADENITIS SUPPURATIVA  Plan:  Hidradenitis suppurative is an autoimmune condition. It is not infectious in nature and is not the result of poor hygiene.  Discussed the chronic nature of the condition and the potential for flare-ups and recurrence. Chronic flare-ups may result in scarring of the skin. Flare-ups may worsen around the time of menstruation, and hormonal therapy may provide benefit. The risk of flare-ups can be lowered by maintaining a healthy weight, not smoking, staying out of hot and humid climates, not wearing tight clothing, and decreasing stress.  Hidradenitis suppurativa may be associated with feelings of negativity. Seek help if symptoms of depression or anxiety develop.  Biologic Therapy: Continue Cosentyx 300 mg every 4 weeks   Follow up in 6 months  Patient to ask PCP about spironolactone     RASH;  Assessment and Plan:  Based on a thorough discussion of this condition and the management approach to it (including a comprehensive discussion of the known risks, side effects and potential benefits of treatment), the patient (family) agrees to implement the following specific plan:  Doxycycline 100 mg twice a day for one month.   Discussed triple cream use today,patient would like to try doxycycline first.   Follow up in 6 months.     DOXYCYCLINE     This medication is usually taken ONCE or TWICE per day, as instructed by your physician.   NOTE: Always take this medication with lots of water! A pill stuck in the throat can cause significant burning and irritation. Avoid “popping” a pill right before bed & stay upright for at least one hour after taking a pill.     WARNING:   Doxycycline increases your sensitivity to the sun, so practice excellent sun protection! Make sure you protect yourself from the sun, either by avoiding being outside between 11 AM and 3 PM, wearing and reapplying sunscreen/sunblock, or wearing sun protective clothing. If you notice any of the following, stop using the  medication and notify your health care provider: headaches; blurred vision; dizziness; sun sensitivity; heartburn-stomach pain; irritation of the esophagus; darkening of scars, gums, or teeth (more often with minocycline); nail changes; yellowing of the eyes or skin (indicating possible liver disease); joint pains-and flu-like symptoms. Taking oral antibiotics with food may help with symptoms of upset stomach.     COMMON SIDE EFFECTS:   Headaches; dizziness; sun sensitivity; irritation of the throat; discoloration of scars, gums, or teeth; nail changes.    When using this medication for acne treatment, a product containing benzoyl peroxide should be used along with these antibiotics to help decrease the possibility of microbial resistance.    WHEN AND WHERE TO CALL WITH CONCERNS  We are here to help!  If you experience any unusual symptoms, then stop taking or using the medication and call our office at [phone] (SKIN).  It is better to be safe than to be sorry!      Rosacea is a chronic rash affecting the mid-face including the nose, cheeks, chin, forehead, and eyelids. The incidence is usually greatest between the ages of 30-60 years and is more common in people with fair skin. Common characteristics include redness, telangiectasias, papules and pustules over affected areas. Rosacea may look similar to acne, but there is a lack of comedones. Occasionally the eyes may also be involved in ocular rosacea. In advanced disease, enlargement of the sebaceous glands in the nose, termed rhinophyma, may be present.     Rosacea results in red spots (papules) and sometimes pustules over the face, but unlike acne there are no blackheads, whiteheads, or cystic nodules. Patients often experience increased facial flushing with prominent blood vessels (erythematotelangiectatic rosacea) and dry, sensitive skin. These symptoms are exacerbated by sun exposure, hot or spicy foods, topical steroids and oil-based facial products.      In ocular rosacea, eyelids may be red and sore due to conjunctivitis, keratitis, and episcleritis. If rhinophyma develops due to enlargement of sebaceous glands, the patient may have an enlarged and irregularly shaped nose with prominent pores. In rosacea that is refractory to treatment, patients can develop persistent redness and swelling of the face due to lymphatic obstruction (Morbihan disease).     Distribution around the cheeks may be confused with the malar or “butterfly rash” of lupus. However, the rash of lupus spares the nasal creases and lacks papules and pustules. If signs of photosensitivity, oral ulcers, arthritis, and kidney dysfunction are present then consider referral to a rheumatologist.     There are many potential causes of rosacea including genetic, environmental, vascular, and inflammatory factors. These include, but are not limited to:  Chronic exposure to ultraviolet radiation   Increased immune responses in the form of cathelicidins that promote vessel dilation and infiltration with white blood cells (neutrophils) into the dermis  Increased matrix metalloproteinases such as collagen and elastase that remodel normal tissue may contribute to inflammation of the skin making it thicker and harder  There is some evidence to suggest that increased numbers of demodex mites on patient skin may contribute to rosacea papules     General Treatment Approach   Avoid exacerbating factors such as heat, spicy foods, and alcohol   Use daily SPF30+ sunscreen and other methods of coverage for sun protection  Use water-based make-up   Avoid applying topical steroids to affected areas as they can cause perioral dermatitis and exacerbate rosacea     Topical Treatment Approach  Metronidazole cream or gel by itself or in combination with oral antibiotics for more severe cases  Azelaic acid cream or lotion is effective for mild inflammatory rosacea when applied twice daily to affected areas  Brimonidine gel and  oxymetazoline hydrochloride cream can reduce facial redness temporarily   Ivermectin cream can treat papulopustular rosacea by controlling demodex mites and inflammation   Pimecrolimus cream or tacrolimus ointment twice a day for 2-3 months can help reduce inflammation    Oral Treatment Approach  Antibiotics such as doxycycline, minocycline, or erythromycin for 1-3 months  Clonidine and carvedilol can help reduce facial flushing and are generally well tolerated. Common side effects include low blood pressure, gastrointestinal upset, dry eyes, blurred vision and low heart rate.   Isotretinoin at low doses can be effective for long term treatment when antibiotics fail. Side effects may make it unsuitable for some patients.   NSAIDs such as diclofenac can help reduce discomfort and redness in the skin.     Procedural/Surgical Treatment Approach   Vascular lasers or intense pulsed light treatment may be used to treat persistent telangiectasia and papulopustular rosacea  Plastic surgery and carbon dioxide lasers may be used to treat rhinophyma

## 2025-04-23 ENCOUNTER — OFFICE VISIT (OUTPATIENT)
Dept: DERMATOLOGY | Facility: CLINIC | Age: 50
End: 2025-04-23
Payer: MEDICARE

## 2025-04-23 DIAGNOSIS — L73.2 HIDRADENITIS SUPPURATIVA: Primary | ICD-10-CM

## 2025-04-23 PROCEDURE — 96372 THER/PROPH/DIAG INJ SC/IM: CPT | Performed by: DERMATOLOGY

## 2025-04-23 NOTE — PROGRESS NOTES
COSENTYX Biologic Injectable Administration     PATIENT PROVIDED COSENTYX INJECTION IN OFFICE !    Additional History of Present Condition:  Patient is present for administration of Cosentyx. Was having a hard time administering injection at home by herself. Would prefer to have done in office. Medication administration order placed. Provider order matches prescription order.     Assessment and Plan:  Based on a thorough discussion of this condition and the management approach to it (including a comprehensive discussion of the known risks, side effects and potential benefits of treatment), the patient (family) agrees to implement the following specific plan:  Seventh Cosentyx injection administered today in the right thigh  Verbal consent obtained to administer.   Next dose due 05/21/2025  Schedule 6 month follow up with ordering provider.       Biologic Injectable Administration Note  Diagnosis: Hidradenitis Suppurativa   This is injection number 7    Informed consent: Discussed risks (Risks of hypersensitivity reaction, injection site reaction, conjunctivitis/keratitis, HSV reactivation, increased susceptibility to parasitic infections, inefficacy were reviewed.) Verbal consent obtained.   Preparation: After discussion potential procedure related risks including pain, bleeding, new infection, reactivation of latent infection, inefficacy, increased risk of malignancy, hypersensitivity reaction, injection site reaction, verbal consent was obtained. The areas were cleansed with alcohol prep pads and allowed to fully air dry for 3 minutes.  Procedure Details:  300 mg was injected subcutaneously in the right thigh  Lot Number: [id number]  Expiration: 09/30/2026  Total Injected: 300 mg  NDC: 9403-8488-77     Patient tolerated procedure well, with minimal pinpoint bleeding that was controlled with pressure. Aftercare was reviewed. Patient provided Cosentyx injection today in office.     INDICATIONS  COSENTYX® (secukinumab) is  a prescription medicine used to treat:  people 2 years of age and older with active psoriatic arthritis  people 6 years of age and older with moderate to severe plaque psoriasis that involves large areas or many areas of the body, and who may benefit from taking injections or pills (systemic therapy) or phototherapy (treatment using ultraviolet or UV light alone or with systemic therapy)  people 4 years of age and older with active enthesitis-related arthritis  adults with active ankylosing spondylitis  adults with active non-radiographic axial spondyloarthritis and objective signs of inflammation    IMPORTANT SAFETY INFORMATION  Do not use COSENTYX if you have had a severe allergic reaction to secukinumab or any of the other ingredients in COSENTYX. See the Medication Guide for a complete list of ingredients.  COSENTYX is a medicine that affects your immune system. COSENTYX may increase your risk of having serious side effects such as:  Infections  COSENTYX may lower the ability of your immune system to fight infections and may increase your risk of infections, sometimes serious.  Your doctor should check you for tuberculosis (TB) before starting treatment with COSENTYX.  If your doctor feels that you are at risk for TB, you may be treated with medicine for TB before you begin treatment with COSENTYX and during treatment with COSENTYX.  Your doctor should watch you closely for signs and symptoms of TB during treatment with COSENTYX. Do not take COSENTYX if you have an active TB infection.  Before starting COSENTYX, tell your doctor if you:  are being treated for an infection  have an infection that does not go away or that keeps coming back  have TB or have been in close contact with someone with TB  think you have an infection or have symptoms of an infection such as: fevers, sweats, or chills; muscle aches; cough; shortness of breath; blood in your phlegm; weight loss; warm, red, or painful skin or sores on your  body; diarrhea or stomach pain; burning when you urinate or urinate more often than normal  After starting COSENTYX, call your doctor right away if you have any signs of infection listed above. Do not use COSENTYX if you have any signs of infection unless you are instructed to by your doctor.  Inflammatory bowel disease  New cases of inflammatory bowel disease or “flare-ups” can happen with COSENTYX, and can sometimes be serious. If you have inflammatory bowel disease (ulcerative colitis or Crohn’s disease), tell your doctor if you have worsening disease symptoms during treatment with COSENTYX or develop new symptoms of stomach pain or diarrhea.  Serious allergic reactions  Serious allergic reactions can occur. Get emergency medical help right away if you get any of the following symptoms: feeling faint; swelling of your face, eyelids, lips, mouth, tongue, or throat; trouble breathing or throat tightness; chest tightness; skin rash or hives (red, itchy bumps). If you have a severe allergic reaction, do not give another injection of COSENTYX.    Before starting COSENTYX, tell your doctor if you:  have any of the conditions or symptoms listed above for infections.  have inflammatory bowel disease (Crohn's disease or ulcerative colitis).  are allergic to latex. The needle cap on the COSENTYX Sensoready® 150 mg/mL pen and the 150 mg/mL and 75 mg/0.5 mL prefilled syringes contains latex.  have recently received or are scheduled to receive an immunization (vaccine). People who take COSENTYX should not receive live vaccines. Children should be brought up to date with all vaccines before starting COSENTYX.  have any other medical conditions.  are pregnant or plan to become pregnant. It is not known if COSENTYX can harm your unborn baby. You and your doctor should decide if you will use COSENTYX.  are breastfeeding or plan to breastfeed. It is not known if COSENTYX passes into your breast milk.  Tell your doctor about all  the medicines you take, including prescription and over-the-counter medicines, vitamins, and herbal supplements. Know the medicines you take. Keep a list of your medicines to show your doctor and pharmacist when you get a new medicine.  How should I use COSENTYX?  See the detailed Instructions for Use that comes with your COSENTYX for information on how to prepare and inject a dose of COSENTYX, and how to properly throw away (dispose of) used COSENTYX Sensoready pens and prefilled syringes.  Use COSENTYX exactly as prescribed by your doctor.  If your doctor decides that you or a caregiver may give your injections of COSENTYX at home, you should receive training on the right way to prepare and inject COSENTYX. Do not try to inject COSENTYX yourself, until you or your caregiver has been shown how to inject COSENTYX by your doctor or nurse.  The most common side effects of COSENTYX include: cold symptoms, diarrhea, and upper respiratory infections. These are not all of the possible side effects of COSENTYX. Call your doctor for medical advice about side effects.  You are encouraged to report negative side effects of prescription drugs to the FDA. Visit www.fda.gov/medwatch, or call 6-662-YYS-9430.

## 2025-05-20 ENCOUNTER — TELEPHONE (OUTPATIENT)
Dept: DERMATOLOGY | Facility: CLINIC | Age: 50
End: 2025-05-20

## 2025-05-20 NOTE — TELEPHONE ENCOUNTER
PA for Cosentyx 300mg auto injector SUBMITTED to [employer]    via    []CMM-KEY:   [x][employer]-Case ID # [id number]   []Availity-Auth ID # NDC #   []Faxed to plan   []Other website   []Phone call Case ID #     [x]PA sent as URGENT    All office notes, labs and other pertaining documents and studies sent. Clinical questions answered. Awaiting determination from insurance company.     Turnaround time for your insurance to make a decision on your Prior Authorization can take 7-21 business days.

## 2025-05-20 NOTE — TELEPHONE ENCOUNTER
Received fax from [employer]. Prior auth is needed for Cosentyx UnoReady 300 mg / 2 ml auto-injectors.    Please initiate prior auth    Key:  [id number]

## 2025-05-22 NOTE — TELEPHONE ENCOUNTER
PA for Cosentyx 300mg pen  APPROVED     Date(s) approved until 12/31/2025    Case #[id number];     Patient advised by          []MyChart Message  []Phone call   [x]LMOM  []L/M to call office as no active Communication consent on file  []Unable to leave detailed message as VM not approved on Communication consent       Pharmacy advised by    [x]Fax  []Phone call  []Secure Chat    Specialty Pharmacy    [x][facility]      Approval letter scanned into [employer] Yes

## 2025-06-05 ENCOUNTER — TELEPHONE (OUTPATIENT)
Age: 50
End: 2025-06-05

## 2025-06-05 NOTE — TELEPHONE ENCOUNTER
Received call from Patient  for DERM NURSE VISIT-  Cosentyx injection. Scheduled 6/17/25 1:00 pm [place]. Verified insurance, provided [place] addr.     Patient verbalized understanding.

## 2025-06-17 ENCOUNTER — OFFICE VISIT (OUTPATIENT)
Dept: DERMATOLOGY | Facility: CLINIC | Age: 50
End: 2025-06-17
Payer: MEDICARE

## 2025-06-17 DIAGNOSIS — L73.2 HIDRADENITIS SUPPURATIVA: Primary | ICD-10-CM

## 2025-06-17 PROCEDURE — 96372 THER/PROPH/DIAG INJ SC/IM: CPT | Performed by: DERMATOLOGY

## 2025-06-17 NOTE — PROGRESS NOTES
COSENTYX Biologic Injectable Administration     Additional History of Present Condition:  Patient is present for education and administration of Cosentyx. Medication administration order placed. Provider order matches prescription order.     Assessment and Plan:  Based on a thorough discussion of this condition and the management approach to it (including a comprehensive discussion of the known risks, side effects and potential benefits of treatment), the patient (family) agrees to implement the following specific plan:  Cosentyx injection administered today in the left thigh  Verbal consent obtained to administer.   Next dose due 07/15/2025  Scheduled 6 month follow up with ordering provider.       Biologic Injectable Administration Note  Diagnosis: Hidradenitis Suppurativa   This is injection number     Informed consent: Discussed risks (Risks of hypersensitivity reaction, injection site reaction, conjunctivitis/keratitis, HSV reactivation, increased susceptibility to parasitic infections, inefficacy were reviewed.) Verbal consent obtained.   Preparation: After discussion potential procedure related risks including pain, bleeding, new infection, reactivation of latent infection, inefficacy, increased risk of malignancy, hypersensitivity reaction, injection site reaction, verbal consent was obtained. The areas were cleansed with alcohol prep pads and allowed to fully air dry for 3 minutes.  Procedure Details:  300 mg was injected subcutaneously in the left thigh  Lot Number: [id number]  Expiration: November 30, 2026  Total Injected: 300 mg  NDC: 3861-6839-41     Patient tolerated procedure well, with minimal pinpoint bleeding that was controlled with pressure. Aftercare was reviewed. Patient provided Cosentyx medication today in office. Patient states she notices swelling only on right thigh after injection.     INDICATIONS  COSENTYX® (secukinumab) is a prescription medicine used to treat:  people 2 years of age and  older with active psoriatic arthritis  people 6 years of age and older with moderate to severe plaque psoriasis that involves large areas or many areas of the body, and who may benefit from taking injections or pills (systemic therapy) or phototherapy (treatment using ultraviolet or UV light alone or with systemic therapy)  people 4 years of age and older with active enthesitis-related arthritis  adults with active ankylosing spondylitis  adults with active non-radiographic axial spondyloarthritis and objective signs of inflammation    IMPORTANT SAFETY INFORMATION  Do not use COSENTYX if you have had a severe allergic reaction to secukinumab or any of the other ingredients in COSENTYX. See the Medication Guide for a complete list of ingredients.  COSENTYX is a medicine that affects your immune system. COSENTYX may increase your risk of having serious side effects such as:  Infections  COSENTYX may lower the ability of your immune system to fight infections and may increase your risk of infections, sometimes serious.  Your doctor should check you for tuberculosis (TB) before starting treatment with COSENTYX.  If your doctor feels that you are at risk for TB, you may be treated with medicine for TB before you begin treatment with COSENTYX and during treatment with COSENTYX.  Your doctor should watch you closely for signs and symptoms of TB during treatment with COSENTYX. Do not take COSENTYX if you have an active TB infection.  Before starting COSENTYX, tell your doctor if you:  are being treated for an infection  have an infection that does not go away or that keeps coming back  have TB or have been in close contact with someone with TB  think you have an infection or have symptoms of an infection such as: fevers, sweats, or chills; muscle aches; cough; shortness of breath; blood in your phlegm; weight loss; warm, red, or painful skin or sores on your body; diarrhea or stomach pain; burning when you urinate or  urinate more often than normal  After starting COSENTYX, call your doctor right away if you have any signs of infection listed above. Do not use COSENTYX if you have any signs of infection unless you are instructed to by your doctor.  Inflammatory bowel disease  New cases of inflammatory bowel disease or “flare-ups” can happen with COSENTYX, and can sometimes be serious. If you have inflammatory bowel disease (ulcerative colitis or Crohn’s disease), tell your doctor if you have worsening disease symptoms during treatment with COSENTYX or develop new symptoms of stomach pain or diarrhea.  Serious allergic reactions  Serious allergic reactions can occur. Get emergency medical help right away if you get any of the following symptoms: feeling faint; swelling of your face, eyelids, lips, mouth, tongue, or throat; trouble breathing or throat tightness; chest tightness; skin rash or hives (red, itchy bumps). If you have a severe allergic reaction, do not give another injection of COSENTYX.    Before starting COSENTYX, tell your doctor if you:  have any of the conditions or symptoms listed above for infections.  have inflammatory bowel disease (Crohn's disease or ulcerative colitis).  are allergic to latex. The needle cap on the COSENTYX Sensoready® 150 mg/mL pen and the 150 mg/mL and 75 mg/0.5 mL prefilled syringes contains latex.  have recently received or are scheduled to receive an immunization (vaccine). People who take COSENTYX should not receive live vaccines. Children should be brought up to date with all vaccines before starting COSENTYX.  have any other medical conditions.  are pregnant or plan to become pregnant. It is not known if COSENTYX can harm your unborn baby. You and your doctor should decide if you will use COSENTYX.  are breastfeeding or plan to breastfeed. It is not known if COSENTYX passes into your breast milk.  Tell your doctor about all the medicines you take, including prescription and  over-the-counter medicines, vitamins, and herbal supplements. Know the medicines you take. Keep a list of your medicines to show your doctor and pharmacist when you get a new medicine.  How should I use COSENTYX?  See the detailed Instructions for Use that comes with your COSENTYX for information on how to prepare and inject a dose of COSENTYX, and how to properly throw away (dispose of) used COSENTYX Sensoready pens and prefilled syringes.  Use COSENTYX exactly as prescribed by your doctor.  If your doctor decides that you or a caregiver may give your injections of COSENTYX at home, you should receive training on the right way to prepare and inject COSENTYX. Do not try to inject COSENTYX yourself, until you or your caregiver has been shown how to inject COSENTYX by your doctor or nurse.  The most common side effects of COSENTYX include: cold symptoms, diarrhea, and upper respiratory infections. These are not all of the possible side effects of COSENTYX. Call your doctor for medical advice about side effects.  You are encouraged to report negative side effects of prescription drugs to the FDA. Visit www.fda.gov/medwatch, or call 2-408-FDA-7465.

## 2025-07-15 ENCOUNTER — OFFICE VISIT (OUTPATIENT)
Dept: DERMATOLOGY | Facility: CLINIC | Age: 50
End: 2025-07-15
Payer: MEDICARE

## 2025-07-15 DIAGNOSIS — L73.2 HIDRADENITIS SUPPURATIVA: Primary | ICD-10-CM

## 2025-07-15 PROCEDURE — 96372 THER/PROPH/DIAG INJ SC/IM: CPT | Performed by: DERMATOLOGY

## 2025-07-15 NOTE — PROGRESS NOTES
"[facility]atology [facility] Note     Patient Name: Abigail Paulino  Encounter Date: 07/15/2025       Have you been cared for by a [facility] Dermatologist in the last 3 years and, if so, which description applies to you? Yes. I have been here within the last 3 years, and my medical history has NOT changed since that time. I am of child-bearing potential.     REVIEW OF SYSTEMS:  Have you recently had or currently have any of the following? No changes in my recent health.   PAST MEDICAL HISTORY:  Have you personally ever had or currently have any of the following?  If \"YES,\" then please provide more detail. No changes in my medical history.   HISTORY OF IMMUNOSUPPRESSION: Do you have a history of any of the following:  Systemic Immunosuppression such as Diabetes, Biologic or Immunotherapy, Chemotherapy, Organ Transplantation, Bone Marrow Transplantation or Prednisone?  No     Answering \"YES\" requires the addition of the dotphrase \"IMMUNOSUPPRESSED\" as the first diagnosis of the patient's visit.   FAMILY HISTORY:  Any \"first degree relatives\" (parent, brother, sister, or child) with the following?    No changes in my family's known health.   PATIENT EXPERIENCE:    Do you want the Dermatologist to perform a COMPLETE skin exam today including a clinical examination under the \"bra and underwear\" areas?  NO  If necessary, do we have your permission to call and leave a detailed message on your Preferred Phone number that includes your specific medical information?  Yes      Allergies[1] Current Medications[2]              Whom besides the patient is providing clinical information about today's encounter?   NO ADDITIONAL HISTORIAN (patient alone provided history)    Physical Exam and Assessment/Plan by Diagnosis:    COSENTYX Biologic Injectable Administration     Additional History of Present Condition:  Patient is present for education and administration of Cosentyx. Medication administration order placed  Provider order " matches prescription order.     Assessment and Plan:  Based on a thorough discussion of this condition and the management approach to it (including a comprehensive discussion of the known risks, side effects and potential benefits of treatment), the patient (family) agrees to implement the following specific plan:  Cosentyx injection administered today in the right thigh  Verbal consent obtained to administer.   Next dose due 08/12/2025 at [facility] ,   Patient provided education and training to continue to administer this at home.   Side effects discussed and how to report  Schedule 6 month follow up with ordering provider.       Biologic Injectable Administration Note  Diagnosis: Hidradenitis suppurativa   This is injection number 9    Informed consent: Discussed risks (Risks of hypersensitivity reaction, injection site reaction, conjunctivitis/keratitis, HSV reactivation, increased susceptibility to parasitic infections, inefficacy were reviewed.) Verbal consent obtained.   Preparation: After discussion potential procedure related risks including pain, bleeding, new infection, reactivation of latent infection, inefficacy, increased risk of malignancy, hypersensitivity reaction, injection site reaction, verbal consent was obtained. The areas were cleansed with alcohol prep pads and allowed to fully air dry for 3 minutes.  Procedure Details:  300 mg was injected subcutaneously in the right thigh   Lot Number: [id number]   Expiration: November 30 2026  Total Injected: 300 mg  NDC: 0787-6406-46     Patient tolerated procedure well, with minimal pinpoint bleeding that was controlled with pressure. Aftercare was reviewed.     INDICATIONS  COSENTYX® (secukinumab) is a prescription medicine used to treat:  people 2 years of age and older with active psoriatic arthritis  people 6 years of age and older with moderate to severe plaque psoriasis that involves large areas or many areas of the body, and who may benefit from  taking injections or pills (systemic therapy) or phototherapy (treatment using ultraviolet or UV light alone or with systemic therapy)  people 4 years of age and older with active enthesitis-related arthritis  adults with active ankylosing spondylitis  adults with active non-radiographic axial spondyloarthritis and objective signs of inflammation    IMPORTANT SAFETY INFORMATION  Do not use COSENTYX if you have had a severe allergic reaction to secukinumab or any of the other ingredients in COSENTYX. See the Medication Guide for a complete list of ingredients.  COSENTYX is a medicine that affects your immune system. COSENTYX may increase your risk of having serious side effects such as:  Infections  COSENTYX may lower the ability of your immune system to fight infections and may increase your risk of infections, sometimes serious.  Your doctor should check you for tuberculosis (TB) before starting treatment with COSENTYX.  If your doctor feels that you are at risk for TB, you may be treated with medicine for TB before you begin treatment with COSENTYX and during treatment with COSENTYX.  Your doctor should watch you closely for signs and symptoms of TB during treatment with COSENTYX. Do not take COSENTYX if you have an active TB infection.  Before starting COSENTYX, tell your doctor if you:  are being treated for an infection  have an infection that does not go away or that keeps coming back  have TB or have been in close contact with someone with TB  think you have an infection or have symptoms of an infection such as: fevers, sweats, or chills; muscle aches; cough; shortness of breath; blood in your phlegm; weight loss; warm, red, or painful skin or sores on your body; diarrhea or stomach pain; burning when you urinate or urinate more often than normal  After starting COSENTYX, call your doctor right away if you have any signs of infection listed above. Do not use COSENTYX if you have any signs of infection unless  you are instructed to by your doctor.  Inflammatory bowel disease  New cases of inflammatory bowel disease or “flare-ups” can happen with COSENTYX, and can sometimes be serious. If you have inflammatory bowel disease (ulcerative colitis or Crohn’s disease), tell your doctor if you have worsening disease symptoms during treatment with COSENTYX or develop new symptoms of stomach pain or diarrhea.  Serious allergic reactions  Serious allergic reactions can occur. Get emergency medical help right away if you get any of the following symptoms: feeling faint; swelling of your face, eyelids, lips, mouth, tongue, or throat; trouble breathing or throat tightness; chest tightness; skin rash or hives (red, itchy bumps). If you have a severe allergic reaction, do not give another injection of COSENTYX.    Before starting COSENTYX, tell your doctor if you:  have any of the conditions or symptoms listed above for infections.  have inflammatory bowel disease (Crohn's disease or ulcerative colitis).  are allergic to latex. The needle cap on the COSENTYX Sensoready® 150 mg/mL pen and the 150 mg/mL and 75 mg/0.5 mL prefilled syringes contains latex.  have recently received or are scheduled to receive an immunization (vaccine). People who take COSENTYX should not receive live vaccines. Children should be brought up to date with all vaccines before starting COSENTYX.  have any other medical conditions.  are pregnant or plan to become pregnant. It is not known if COSENTYX can harm your unborn baby. You and your doctor should decide if you will use COSENTYX.  are breastfeeding or plan to breastfeed. It is not known if COSENTYX passes into your breast milk.  Tell your doctor about all the medicines you take, including prescription and over-the-counter medicines, vitamins, and herbal supplements. Know the medicines you take. Keep a list of your medicines to show your doctor and pharmacist when you get a new medicine.  How should I use  COSENTYX?  See the detailed Instructions for Use that comes with your COSENTYX for information on how to prepare and inject a dose of COSENTYX, and how to properly throw away (dispose of) used COSENTYX Sensoready pens and prefilled syringes.  Use COSENTYX exactly as prescribed by your doctor.  If your doctor decides that you or a caregiver may give your injections of COSENTYX at home, you should receive training on the right way to prepare and inject COSENTYX. Do not try to inject COSENTYX yourself, until you or your caregiver has been shown how to inject COSENTYX by your doctor or nurse.  The most common side effects of COSENTYX include: cold symptoms, diarrhea, and upper respiratory infections. These are not all of the possible side effects of COSENTYX. Call your doctor for medical advice about side effects.  You are encouraged to report negative side effects of prescription drugs to the FDA. Visit www.fda.gov/medwatch, or call 3-041-FDA-4128.        [1]   Allergies  Allergen Reactions    Erythromycin Abdominal Pain, Other (See Comments) and Vomiting     vomit      Vancomycin Abdominal Pain, GI Intolerance, Other (See Comments) and Vomiting     vomiting     [2]   Current Outpatient Medications:     B Complex Vitamins (Vitamin B Complex) TABS, , Disp: , Rfl:     busPIRone (BUSPAR) 15 mg tablet, Take 15 mg by mouth 2 (two) times a day, Disp: , Rfl:     cetirizine (ZyrTEC) 10 mg tablet, Take 10 mg by mouth daily, Disp: , Rfl:     citalopram (CeleXA) 20 mg tablet, Take 20 mg by mouth daily, Disp: , Rfl:     Diclofenac Sodium (VOLTAREN) 1 %, , Disp: , Rfl:     famotidine (PEPCID) 40 MG tablet, Take 40 mg by mouth daily, Disp: , Rfl:     losartan (COZAAR) 100 MG tablet, Take 100 mg by mouth daily, Disp: , Rfl:     metFORMIN (GLUCOPHAGE-XR) 500 mg 24 hr tablet, take 2 tablets by mouth twice a day with meals, Disp: , Rfl:     methocarbamol (ROBAXIN) 500 mg tablet, Take 1 tablet (500 mg total) by mouth 4 (four) times a  day, Disp: 30 tablet, Rfl: 0    NP Thyroid 15 MG tablet, take 1 tablet by mouth IN ADDITION  MGS FOR A TOTAL  MGS FOR 7 DAYS, Disp: , Rfl:     Omega-3 1000 MG CAPS, Take 1 g by mouth 2 (two) times a day, Disp: , Rfl:     Prevalite 4 g packet, take 1 packet by mouth twice a day, Disp: , Rfl:     pyridoxine (B-6) 100 MG tablet, Take 100 mg by mouth daily, Disp: , Rfl:     Secukinumab (Cosentyx UnoReady) 300 MG/2ML SOAJ, Inject 300mg on week 8 then every 4 weeks there after for maintenance dosing., Disp: 2 mL, Rfl: 11    Secukinumab (Cosentyx UnoReady) 300 MG/2ML SOAJ, Inject 300mg on week 0, 1, 2, 3, and 4 for loading dose., Disp: 10 mL, Rfl: 0    thyroid (ARMOUR) 120 MG tablet, Take 120 mg by mouth daily, Disp: , Rfl:

## 2025-08-12 ENCOUNTER — CLINICAL SUPPORT (OUTPATIENT)
Dept: DERMATOLOGY | Facility: CLINIC | Age: 50
End: 2025-08-12
Payer: MEDICARE